# Patient Record
Sex: FEMALE | Race: WHITE | NOT HISPANIC OR LATINO | Employment: OTHER | URBAN - NONMETROPOLITAN AREA
[De-identification: names, ages, dates, MRNs, and addresses within clinical notes are randomized per-mention and may not be internally consistent; named-entity substitution may affect disease eponyms.]

---

## 2017-05-11 ENCOUNTER — OFFICE VISIT (OUTPATIENT)
Dept: CARDIOLOGY | Facility: CLINIC | Age: 57
End: 2017-05-11

## 2017-05-11 VITALS
DIASTOLIC BLOOD PRESSURE: 80 MMHG | SYSTOLIC BLOOD PRESSURE: 120 MMHG | WEIGHT: 209 LBS | OXYGEN SATURATION: 96 % | BODY MASS INDEX: 37.03 KG/M2 | HEART RATE: 78 BPM | HEIGHT: 63 IN

## 2017-05-11 DIAGNOSIS — I10 ESSENTIAL HYPERTENSION: ICD-10-CM

## 2017-05-11 DIAGNOSIS — E66.09 NON MORBID OBESITY DUE TO EXCESS CALORIES: ICD-10-CM

## 2017-05-11 DIAGNOSIS — Z72.0 TOBACCO ABUSE: ICD-10-CM

## 2017-05-11 DIAGNOSIS — E78.5 DYSLIPIDEMIA: ICD-10-CM

## 2017-05-11 DIAGNOSIS — I49.1 PREMATURE ATRIAL CONTRACTIONS: ICD-10-CM

## 2017-05-11 DIAGNOSIS — R55 SYNCOPE AND COLLAPSE: Primary | ICD-10-CM

## 2017-05-11 PROCEDURE — 99204 OFFICE O/P NEW MOD 45 MIN: CPT | Performed by: INTERNAL MEDICINE

## 2017-05-11 PROCEDURE — 93000 ELECTROCARDIOGRAM COMPLETE: CPT | Performed by: INTERNAL MEDICINE

## 2017-05-11 RX ORDER — METOPROLOL SUCCINATE 25 MG/1
25 TABLET, EXTENDED RELEASE ORAL DAILY
COMMUNITY
Start: 2017-05-02

## 2017-05-11 RX ORDER — ASPIRIN 81 MG/1
81 TABLET, CHEWABLE ORAL DAILY
COMMUNITY
End: 2019-02-20

## 2017-05-11 RX ORDER — ATORVASTATIN CALCIUM 40 MG/1
40 TABLET, FILM COATED ORAL DAILY
COMMUNITY
Start: 2017-05-01

## 2017-05-16 ENCOUNTER — OUTSIDE FACILITY SERVICE (OUTPATIENT)
Dept: CARDIOLOGY | Facility: CLINIC | Age: 57
End: 2017-05-16

## 2017-05-16 PROCEDURE — 93306 TTE W/DOPPLER COMPLETE: CPT | Performed by: INTERNAL MEDICINE

## 2017-07-12 ENCOUNTER — OFFICE VISIT (OUTPATIENT)
Dept: CARDIOLOGY | Facility: CLINIC | Age: 57
End: 2017-07-12

## 2017-07-12 VITALS
HEIGHT: 63 IN | WEIGHT: 215 LBS | HEART RATE: 74 BPM | BODY MASS INDEX: 38.09 KG/M2 | OXYGEN SATURATION: 95 % | DIASTOLIC BLOOD PRESSURE: 70 MMHG | SYSTOLIC BLOOD PRESSURE: 110 MMHG

## 2017-07-12 DIAGNOSIS — R55 SYNCOPE AND COLLAPSE: Primary | ICD-10-CM

## 2017-07-12 DIAGNOSIS — I10 ESSENTIAL HYPERTENSION: ICD-10-CM

## 2017-07-12 DIAGNOSIS — I47.1 PAROXYSMAL SVT (SUPRAVENTRICULAR TACHYCARDIA) (HCC): ICD-10-CM

## 2017-07-12 DIAGNOSIS — E78.5 DYSLIPIDEMIA: ICD-10-CM

## 2017-07-12 DIAGNOSIS — E66.09 NON MORBID OBESITY DUE TO EXCESS CALORIES: ICD-10-CM

## 2017-07-12 DIAGNOSIS — Z72.0 TOBACCO ABUSE: ICD-10-CM

## 2017-07-12 PROBLEM — I47.10 PAROXYSMAL SVT (SUPRAVENTRICULAR TACHYCARDIA): Status: ACTIVE | Noted: 2017-07-12

## 2017-07-12 PROCEDURE — 99214 OFFICE O/P EST MOD 30 MIN: CPT | Performed by: INTERNAL MEDICINE

## 2017-07-12 RX ORDER — LANOLIN ALCOHOL/MO/W.PET/CERES
1000 CREAM (GRAM) TOPICAL DAILY
COMMUNITY

## 2017-07-12 NOTE — PROGRESS NOTES
Reason for Visit: cardiovascular follow up.    HPI:  Shannon Coombs is a 57 y.o. female is here today for follow-up.  She has been feeling better.  She thinks the metoprolol has helped significantly.  Her lipid panel was repeated and she reports the numbers looked much better.  She has not been able to exercise much but is hoping to get the weight off.  She is trying to cut back on the cigarettes but has not done well over the past couple of weeks.      Previous Cardiac Testing and Procedures:  - Echo (6/25/14) EF 65%, grade 2 diastolic dysfunction, mild MR and TR, RVSP 48 mmHg  - Holter monitor (04/14/2017) 21,180 one atrial ectopic beats, runs of nonsustained SVT up to 5 beats at 167 bpm  - Echo (05/16/2017) EF 65%, normal diastolic function, normal valves    Patient Active Problem List   Diagnosis   • Dizziness   • Fatigue   • Syncope and collapse   • Paroxysmal SVT (supraventricular tachycardia)   • Non morbid obesity due to excess calories   • Dyslipidemia   • Essential hypertension   • Tobacco abuse       Social History   Substance Use Topics   • Smoking status: Current Every Day Smoker     Packs/day: 0.50     Years: 35.00     Types: Cigarettes   • Smokeless tobacco: Never Used   • Alcohol use Yes      Comment: occ       Family History   Problem Relation Age of Onset   • Dementia Mother    • Hyperlipidemia Mother    • Heart disease Father        The following portions of the patient's history were reviewed and updated as appropriate: allergies, current medications, past family history, past medical history, past social history, past surgical history and problem list.      Current Outpatient Prescriptions:   •  aspirin 81 MG chewable tablet, Chew 81 mg Daily., Disp: , Rfl:   •  atorvastatin (LIPITOR) 20 MG tablet, Take 20 mg by mouth Daily., Disp: , Rfl:   •  Calcium Carbonate-Vitamin D (CALTRATE 600+D PO), Take  by mouth 2 (Two) Times a Day., Disp: , Rfl:   •  metoprolol succinate XL  "(TOPROL-XL) 25 MG 24 hr tablet, Take 25 mg by mouth Daily., Disp: , Rfl:   •  vitamin B-12 (CYANOCOBALAMIN) 1000 MCG tablet, Take 1,000 mcg by mouth Daily., Disp: , Rfl:     Review of Systems   Constitution: Positive for malaise/fatigue. Negative for chills, decreased appetite and fever.   HENT: Positive for headaches. Negative for congestion and nosebleeds.    Eyes: Negative for blurred vision and double vision.   Cardiovascular: Positive for irregular heartbeat (when she gets stressed only now) and palpitations (when she gets stressed only now). Negative for chest pain, leg swelling and syncope.   Respiratory: Negative for cough and shortness of breath.    Endocrine: Positive for cold intolerance and heat intolerance.   Hematologic/Lymphatic: Bruises/bleeds easily.   Skin: Negative for dry skin, itching and rash.   Musculoskeletal: Positive for back pain, joint pain, muscle cramps and neck pain.   Gastrointestinal: Negative for abdominal pain, constipation, diarrhea, heartburn and melena.   Genitourinary: Positive for frequency. Negative for dysuria and hematuria.   Neurological: Negative for dizziness and light-headedness.   Psychiatric/Behavioral: Negative for depression. The patient has insomnia. The patient is not nervous/anxious.        Objective   /70 (BP Location: Left arm, Patient Position: Sitting, Cuff Size: Adult)  Pulse 74  Ht 63\" (160 cm)  Wt 215 lb (97.5 kg)  SpO2 95%  BMI 38.09 kg/m2  Physical Exam   Constitutional: She is oriented to person, place, and time. She appears well-developed and well-nourished.   HENT:   Head: Normocephalic and atraumatic.   Cardiovascular: Normal rate, regular rhythm and normal heart sounds.    No murmur heard.  Pulmonary/Chest: Effort normal and breath sounds normal.   Musculoskeletal: She exhibits no edema.   Neurological: She is alert and oriented to person, place, and time.   Skin: Skin is warm and dry.   Psychiatric: She has a normal mood and affect. "     Procedures      ICD-10-CM ICD-9-CM   1. Syncope and collapse R55 780.2   2. Paroxysmal SVT (supraventricular tachycardia) I47.1 427.0   3. Essential hypertension I10 401.9   4. Dyslipidemia E78.5 272.4   5. Tobacco abuse Z72.0 305.1   6. Non morbid obesity due to excess calories E66.09 278.00         Assessment/Plan:  1. Syncope: Most likely vasovagal.  No further episodes.  Structurally normal heart on echo.     2. Paroxysmal SVT: Short runs of nonsustained SVT noted on Holter monitor.  Likely contributing to syncope.  Symptoms improved with metoprolol.     3. Essential hypertension: Well controlled on metoprolol.      4. Dyslipidemia: Managed on atorvastatin.  Improved control per patient on repeat labs.      5. Tobacco abuse: Continues to smoke 1/2 pack per day. Continue to  on cessation.     6. Obesity: BMI 38, continue to  on diet, excise, weight loss.

## 2018-02-14 ENCOUNTER — OFFICE VISIT (OUTPATIENT)
Dept: CARDIOLOGY | Facility: CLINIC | Age: 58
End: 2018-02-14

## 2018-02-14 VITALS
SYSTOLIC BLOOD PRESSURE: 122 MMHG | BODY MASS INDEX: 38.09 KG/M2 | HEIGHT: 63 IN | DIASTOLIC BLOOD PRESSURE: 80 MMHG | HEART RATE: 81 BPM | WEIGHT: 215 LBS | OXYGEN SATURATION: 96 %

## 2018-02-14 DIAGNOSIS — E66.09 NON MORBID OBESITY DUE TO EXCESS CALORIES: ICD-10-CM

## 2018-02-14 DIAGNOSIS — I10 ESSENTIAL HYPERTENSION: ICD-10-CM

## 2018-02-14 DIAGNOSIS — I47.1 PAROXYSMAL SVT (SUPRAVENTRICULAR TACHYCARDIA) (HCC): Primary | ICD-10-CM

## 2018-02-14 DIAGNOSIS — E78.5 DYSLIPIDEMIA: ICD-10-CM

## 2018-02-14 DIAGNOSIS — Z72.0 TOBACCO ABUSE: ICD-10-CM

## 2018-02-14 PROCEDURE — 99214 OFFICE O/P EST MOD 30 MIN: CPT | Performed by: INTERNAL MEDICINE

## 2018-02-14 RX ORDER — NICOTINE 21 MG/24HR
1 PATCH, TRANSDERMAL 24 HOURS TRANSDERMAL EVERY 24 HOURS
Qty: 21 PATCH | Refills: 6 | Status: SHIPPED | OUTPATIENT
Start: 2018-02-14 | End: 2018-03-14

## 2018-02-14 NOTE — PROGRESS NOTES
Reason for Visit: cardiovascular follow up.    HPI:  Shannon Coombs is a 57 y.o. female is here today for follow-up.  She has been doing well from a cardiac standpoint.  She denies any chest pain, palpitations, dizziness, syncope, PND, or orthopnea.  She has some muscle cramps, particularly in her hands and was considering taking CoQ10.  She bought some over the counter but has not started taking it.  She has not had any further syncopal episode. Denies any episodes of heart racing.  Continues to smoke about 1/2 PPD.      Previous Cardiac Testing and Procedures:  - Echo (6/25/14) EF 65%, grade 2 diastolic dysfunction, mild MR and TR, RVSP 48 mmHg  - Holter monitor (04/14/2017) 21,180 one atrial ectopic beats, runs of nonsustained SVT up to 5 beats at 167 bpm  - Echo (05/16/2017) EF 65%, normal diastolic function, normal valves    Patient Active Problem List   Diagnosis   • Dizziness   • Fatigue   • Syncope and collapse   • Paroxysmal SVT (supraventricular tachycardia)   • Non morbid obesity due to excess calories   • Dyslipidemia   • Essential hypertension   • Tobacco abuse       Social History   Substance Use Topics   • Smoking status: Current Every Day Smoker     Packs/day: 0.50     Years: 35.00     Types: Cigarettes   • Smokeless tobacco: Never Used   • Alcohol use Yes      Comment: occ       Family History   Problem Relation Age of Onset   • Dementia Mother    • Hyperlipidemia Mother    • Heart disease Father    • Hyperlipidemia Father        The following portions of the patient's history were reviewed and updated as appropriate: allergies, current medications, past family history, past medical history, past social history, past surgical history and problem list.      Current Outpatient Prescriptions:   •  aspirin 81 MG chewable tablet, Chew 81 mg Daily., Disp: , Rfl:   •  atorvastatin (LIPITOR) 20 MG tablet, Take 20 mg by mouth Daily., Disp: , Rfl:   •  Calcium Carbonate-Vitamin D (CALTRATE  "600+D PO), Take  by mouth 2 (Two) Times a Day., Disp: , Rfl:   •  metoprolol succinate XL (TOPROL-XL) 25 MG 24 hr tablet, Take 25 mg by mouth Daily., Disp: , Rfl:   •  vitamin B-12 (CYANOCOBALAMIN) 1000 MCG tablet, Take 1,000 mcg by mouth Daily., Disp: , Rfl:   •  nicotine (NICODERM CQ) 14 MG/24HR patch, Place 1 patch on the skin Daily., Disp: 21 patch, Rfl: 6  •  nicotine polacrilex (NICORETTE) 4 MG gum, Chew 1 each As Needed for Smoking Cessation., Disp: 150 each, Rfl: 6    Review of Systems   Constitution: Negative for chills and fever.   Cardiovascular: Negative for chest pain and paroxysmal nocturnal dyspnea.   Respiratory: Negative for cough and shortness of breath.    Skin: Negative for rash.   Gastrointestinal: Negative for abdominal pain and heartburn.   Neurological: Negative for dizziness and numbness.       Objective   /80 (BP Location: Left arm, Patient Position: Sitting, Cuff Size: Adult)  Pulse 81  Ht 160 cm (62.99\")  Wt 97.5 kg (215 lb)  SpO2 96%  BMI 38.1 kg/m2  Physical Exam   Constitutional: She is oriented to person, place, and time. She appears well-developed and well-nourished.   HENT:   Head: Normocephalic and atraumatic.   Cardiovascular: Normal rate, regular rhythm and normal heart sounds.    No murmur heard.  Pulmonary/Chest: Effort normal and breath sounds normal.   Musculoskeletal: She exhibits no edema.   Neurological: She is alert and oriented to person, place, and time.   Skin: Skin is warm and dry.   Psychiatric: She has a normal mood and affect.     Procedures      ICD-10-CM ICD-9-CM   1. Paroxysmal SVT (supraventricular tachycardia) I47.1 427.0   2. Essential hypertension I10 401.9   3. Dyslipidemia E78.5 272.4   4. Tobacco abuse Z72.0 305.1   5. Non morbid obesity due to excess calories E66.09 278.00         Assessment/Plan:  1. Paroxysmal SVT: Symptoms controlled with metoprolol.  Patient denies any further palpitations or heart racing.      2. Essential hypertension: " Blood pressure remains well-controlled on current therapy.       3. Dyslipidemia: Continue atorvastatin.  I agree with plans to try CoQ10.       5. Tobacco abuse: Continues to smoke 1/2 pack per day.  But interested in quitting.  Will try nicotine replacement therapy.  Time spent counseling patient, greater than 10 minutes.      6. Obesity: BMI 38, continue to  on lifestyle modification and weight loss.

## 2018-03-14 ENCOUNTER — OFFICE VISIT (OUTPATIENT)
Dept: OTOLARYNGOLOGY | Facility: CLINIC | Age: 58
End: 2018-03-14

## 2018-03-14 VITALS
HEART RATE: 69 BPM | DIASTOLIC BLOOD PRESSURE: 87 MMHG | WEIGHT: 215 LBS | HEIGHT: 63 IN | BODY MASS INDEX: 38.09 KG/M2 | TEMPERATURE: 97.6 F | SYSTOLIC BLOOD PRESSURE: 130 MMHG

## 2018-03-14 DIAGNOSIS — H91.90 HEARING LOSS, UNSPECIFIED HEARING LOSS TYPE, UNSPECIFIED LATERALITY: Primary | ICD-10-CM

## 2018-03-14 DIAGNOSIS — J34.2 DEVIATED NASAL SEPTUM: ICD-10-CM

## 2018-03-14 DIAGNOSIS — J30.9 ALLERGIC RHINITIS, UNSPECIFIED CHRONICITY, UNSPECIFIED SEASONALITY, UNSPECIFIED TRIGGER: ICD-10-CM

## 2018-03-14 DIAGNOSIS — J01.90 ACUTE SINUSITIS, RECURRENCE NOT SPECIFIED, UNSPECIFIED LOCATION: ICD-10-CM

## 2018-03-14 PROCEDURE — 4004F PT TOBACCO SCREEN RCVD TLK: CPT | Performed by: NURSE PRACTITIONER

## 2018-03-14 PROCEDURE — 99203 OFFICE O/P NEW LOW 30 MIN: CPT | Performed by: NURSE PRACTITIONER

## 2018-03-14 RX ORDER — AMOXICILLIN AND CLAVULANATE POTASSIUM 875; 125 MG/1; MG/1
1 TABLET, FILM COATED ORAL EVERY 12 HOURS SCHEDULED
Qty: 20 TABLET | Refills: 0 | Status: SHIPPED | OUTPATIENT
Start: 2018-03-14 | End: 2018-03-24

## 2018-03-14 RX ORDER — CYCLOBENZAPRINE HCL 10 MG
10 TABLET ORAL DAILY PRN
COMMUNITY
Start: 2018-01-09

## 2018-03-14 NOTE — PATIENT INSTRUCTIONS
Steps to Quit Smoking  Smoking tobacco can be harmful to your health and can affect almost every organ in your body. Smoking puts you, and those around you, at risk for developing many serious chronic diseases. Quitting smoking is difficult, but it is one of the best things that you can do for your health. It is never too late to quit.  What are the benefits of quitting smoking?  When you quit smoking, you lower your risk of developing serious diseases and conditions, such as:  · Lung cancer or lung disease, such as COPD.  · Heart disease.  · Stroke.  · Heart attack.  · Infertility.  · Osteoporosis and bone fractures.  Additionally, symptoms such as coughing, wheezing, and shortness of breath may get better when you quit. You may also find that you get sick less often because your body is stronger at fighting off colds and infections. If you are pregnant, quitting smoking can help to reduce your chances of having a baby of low birth weight.  How do I get ready to quit?  When you decide to quit smoking, create a plan to make sure that you are successful. Before you quit:  · Pick a date to quit. Set a date within the next two weeks to give you time to prepare.  · Write down the reasons why you are quitting. Keep this list in places where you will see it often, such as on your bathroom mirror or in your car or wallet.  · Identify the people, places, things, and activities that make you want to smoke (triggers) and avoid them. Make sure to take these actions:  ¨ Throw away all cigarettes at home, at work, and in your car.  ¨ Throw away smoking accessories, such as ashtrays and lighters.  ¨ Clean your car and make sure to empty the ashtray.  ¨ Clean your home, including curtains and carpets.  · Tell your family, friends, and coworkers that you are quitting. Support from your loved ones can make quitting easier.  · Talk with your health care provider about your options for quitting smoking.  · Find out what treatment  options are covered by your health insurance.  What strategies can I use to quit smoking?  Talk with your healthcare provider about different strategies to quit smoking. Some strategies include:  · Quitting smoking altogether instead of gradually lessening how much you smoke over a period of time. Research shows that quitting “cold turkey” is more successful than gradually quitting.  · Attending in-person counseling to help you build problem-solving skills. You are more likely to have success in quitting if you attend several counseling sessions. Even short sessions of 10 minutes can be effective.  · Finding resources and support systems that can help you to quit smoking and remain smoke-free after you quit. These resources are most helpful when you use them often. They can include:  ¨ Online chats with a counselor.  ¨ Telephone quitlines.  ¨ Printed self-help materials.  ¨ Support groups or group counseling.  ¨ Text messaging programs.  ¨ Mobile phone applications.  · Taking medicines to help you quit smoking. (If you are pregnant or breastfeeding, talk with your health care provider first.) Some medicines contain nicotine and some do not. Both types of medicines help with cravings, but the medicines that include nicotine help to relieve withdrawal symptoms. Your health care provider may recommend:  ¨ Nicotine patches, gum, or lozenges.  ¨ Nicotine inhalers or sprays.  ¨ Non-nicotine medicine that is taken by mouth.  Talk with your health care provider about combining strategies, such as taking medicines while you are also receiving in-person counseling. Using these two strategies together makes you more likely to succeed in quitting than if you used either strategy on its own.  If you are pregnant or breastfeeding, talk with your health care provider about finding counseling or other support strategies to quit smoking. Do not take medicine to help you quit smoking unless told to do so by your health care  provider.  What things can I do to make it easier to quit?  Quitting smoking might feel overwhelming at first, but there is a lot that you can do to make it easier. Take these important actions:  · Reach out to your family and friends and ask that they support and encourage you during this time. Call telephone quitlines, reach out to support groups, or work with a counselor for support.  · Ask people who smoke to avoid smoking around you.  · Avoid places that trigger you to smoke, such as bars, parties, or smoke-break areas at work.  · Spend time around people who do not smoke.  · Lessen stress in your life, because stress can be a smoking trigger for some people. To lessen stress, try:  ¨ Exercising regularly.  ¨ Deep-breathing exercises.  ¨ Yoga.  ¨ Meditating.  ¨ Performing a body scan. This involves closing your eyes, scanning your body from head to toe, and noticing which parts of your body are particularly tense. Purposefully relax the muscles in those areas.  · Download or purchase mobile phone or tablet apps (applications) that can help you stick to your quit plan by providing reminders, tips, and encouragement. There are many free apps, such as QuitGuide from the CDC (Centers for Disease Control and Prevention). You can find other support for quitting smoking (smoking cessation) through smokefree.gov and other websites.  How will I feel when I quit smoking?  Within the first 24 hours of quitting smoking, you may start to feel some withdrawal symptoms. These symptoms are usually most noticeable 2-3 days after quitting, but they usually do not last beyond 2-3 weeks. Changes or symptoms that you might experience include:  · Mood swings.  · Restlessness, anxiety, or irritation.  · Difficulty concentrating.  · Dizziness.  · Strong cravings for sugary foods in addition to nicotine.  · Mild weight gain.  · Constipation.  · Nausea.  · Coughing or a sore throat.  · Changes in how your medicines work in your  body.  · A depressed mood.  · Difficulty sleeping (insomnia).  After the first 2-3 weeks of quitting, you may start to notice more positive results, such as:  · Improved sense of smell and taste.  · Decreased coughing and sore throat.  · Slower heart rate.  · Lower blood pressure.  · Clearer skin.  · The ability to breathe more easily.  · Fewer sick days.  Quitting smoking is very challenging for most people. Do not get discouraged if you are not successful the first time. Some people need to make many attempts to quit before they achieve long-term success. Do your best to stick to your quit plan, and talk with your health care provider if you have any questions or concerns.  This information is not intended to replace advice given to you by your health care provider. Make sure you discuss any questions you have with your health care provider.  Document Released: 12/12/2002 Document Revised: 08/15/2017 Document Reviewed: 05/03/2016  [employer] Interactive Patient Education © 2017 [employer]    Medical vs surgical options discussed    Call for problems or worsening symptoms    If worsens or failure to improve, consider imaging studies  Start Flonase

## 2018-03-14 NOTE — PROGRESS NOTES
YOB: 1960  Location: [facility]  Location Address: [address] [place] [address], [facility], [address] [place] [address]  Location Phone: [phone]    Chief Complaint   Patient presents with   • Sinus Problem       History of Present Illness  Shannon Coombs is a 57 y.o. female.  Shannon Coombs is here for evaluation of ENT complaints. The patient has had problems with ear fullness, ear pressure and popping and cracking of the ear, nasal drainage, nasal congestion, sinusitis and sinus pressure  The symptoms are not localized to a particular location. The patient has had moderate symptoms. The symptoms have been present for the last several months The symptoms are aggravated by  no identifiable factors. The symptoms are improved by no identifiable factors.  Positive for smoking.  Denies acute hearing changes.  Positive for sinus pressure, has not been treated with abx.  She has a rx for steroids and flonase she hasnt started wanted to see us first.  Denies headaches, fever, epistaxis.         Past Medical History:   Diagnosis Date   • Atrial premature depolarization    • Cardiac arrhythmia    • Dental abscess    • Dizziness and giddiness    • Hypotension    • Knee pain, right    • Rotator cuff tear    • Syncope and collapse    • Syncope and collapse    • Visual disturbance        Past Surgical History:   Procedure Laterality Date   • GANGLION CYST EXCISION      foot       Outpatient Prescriptions Marked as Taking for the 3/14/18 encounter (Office Visit) with ANTIONE Zarco   Medication Sig Dispense Refill   • aspirin 81 MG chewable tablet Chew 81 mg Daily.     • atorvastatin (LIPITOR) 20 MG tablet Take 20 mg by mouth Daily.     • Calcium Carbonate-Vitamin D (CALTRATE 600+D PO) Take  by mouth 2 (Two) Times a Day.     • cyclobenzaprine (FLEXERIL) 10 MG tablet      • metoprolol succinate XL (TOPROL-XL) 25 MG 24 hr tablet Take 25 mg by mouth Daily.     • vitamin B-12 (CYANOCOBALAMIN) 1000  MCG tablet Take 1,000 mcg by mouth Daily.         Aleve [naproxen sodium]    Family History   Problem Relation Age of Onset   • Dementia Mother    • Hyperlipidemia Mother    • Heart disease Father    • Hyperlipidemia Father        Social History     Social History   • Marital status:      Spouse name: N/A   • Number of children: N/A   • Years of education: N/A     Occupational History   • Not on file.     Social History Main Topics   • Smoking status: Current Every Day Smoker     Packs/day: 0.50     Years: 35.00     Types: Cigarettes   • Smokeless tobacco: Never Used   • Alcohol use Yes      Comment: rarely   • Drug use: No   • Sexual activity: Not on file     Other Topics Concern   • Not on file     Social History Narrative   • No narrative on file       Review of Systems   Constitutional: Negative.    HENT:        SEE HPI   Eyes: Negative.    Respiratory: Negative.    Cardiovascular: Negative.    Gastrointestinal: Negative.    Endocrine: Negative.    Genitourinary: Negative.    Musculoskeletal: Negative.    Skin: Negative.    Allergic/Immunologic: Negative.    Neurological: Negative.    Hematological: Negative.    Psychiatric/Behavioral: Negative.        Vitals:    03/14/18 1127   BP: 130/87   Pulse: 69   Temp: 97.6 °F (36.4 °C)       Body mass index is 38.09 kg/m².    Objective     Physical Exam  CONSTITUTIONAL: well nourished, alert, oriented, in no acute distress     COMMUNICATION AND VOICE: able to communicate normally, normal voice quality    HEAD: normocephalic, no lesions, atraumatic, no tenderness, no masses     FACE: appearance normal, no lesions, no tenderness, no deformities, facial motion symmetric    SALIVARY GLANDS: parotid glands with no tenderness, no swelling, no masses, submandibular glands with normal size, nontender    EYES: ocular motility normal, eyelids normal, orbits normal, no proptosis, conjunctiva normal , pupils equal, round     EARS:  Hearing: response to conversational voice  normal bilaterally   External Ears: auricles without lesions  Otoscopic: tympanic membrane appearance normal, no lesions, no perforation, normal mobility, no fluid    NOSE:  External Nose: structure normal, no tenderness on palpation, no nasal discharge, no lesions, no evidence of trauma, nostrils patent   Intranasal Exam: nasal mucosa edema, vestibule within normal limits, inferior turbinate normal, right septal deviation with spur     ORAL:  Lips: upper and lower lips without lesion   Teeth: dentition within normal limits for age   Gums: gingivae healthy   Oral Mucosa: oral mucosa normal, no mucosal lesions   Floor of Mouth: Warthin’s duct patent, mucosa normal  Tongue: lingual mucosa normal without lesions, normal tongue mobility   Palate: soft and hard palates with normal mucosa and structure  Oropharynx: oropharyngeal mucosa normal    NECK: neck appearance normal, no mass,  noted without erythema or tenderness    THYROID: no overt thyromegaly, no tenderness, nodules or mass present on palpation, position midline     LYMPH NODES: no lymphadenopathy    CHEST/RESPIRATORY: respiratory effort normal,     CARDIOVASCULAR:  extremities without cyanosis or edema      NEUROLOGIC/PSYCHIATRIC: oriented to time, place and person, mood normal, affect appropriate, CN II-XII intact grossly    Assessment/Plan   Shannon was seen today for sinus problem.    Diagnoses and all orders for this visit:    Hearing loss, unspecified hearing loss type, unspecified laterality  -     Comprehensive Hearing Test; Future    Allergic rhinitis, unspecified chronicity, unspecified seasonality, unspecified trigger    Deviated nasal septum    Acute sinusitis, recurrence not specified, unspecified location    Other orders  -     amoxicillin-clavulanate (AUGMENTIN) 875-125 MG per tablet; Take 1 tablet by mouth Every 12 (Twelve) Hours for 10 days.  -     mupirocin (BACTROBAN) 2 % nasal ointment; into each nostril 2 (Two) Times a Day for 14 days.  Apply to the nose twice daily      * Surgery not found *  Orders Placed This Encounter   Procedures   • Comprehensive Hearing Test     Standing Status:   Future     Standing Expiration Date:   3/14/2019     Order Specific Question:   Laterality     Answer:   Bilateral     Return in about 8 weeks (around 5/9/2018).       Patient Instructions   Steps to Quit Smoking  Smoking tobacco can be harmful to your health and can affect almost every organ in your body. Smoking puts you, and those around you, at risk for developing many serious chronic diseases. Quitting smoking is difficult, but it is one of the best things that you can do for your health. It is never too late to quit.  What are the benefits of quitting smoking?  When you quit smoking, you lower your risk of developing serious diseases and conditions, such as:  · Lung cancer or lung disease, such as COPD.  · Heart disease.  · Stroke.  · Heart attack.  · Infertility.  · Osteoporosis and bone fractures.  Additionally, symptoms such as coughing, wheezing, and shortness of breath may get better when you quit. You may also find that you get sick less often because your body is stronger at fighting off colds and infections. If you are pregnant, quitting smoking can help to reduce your chances of having a baby of low birth weight.  How do I get ready to quit?  When you decide to quit smoking, create a plan to make sure that you are successful. Before you quit:  · Pick a date to quit. Set a date within the next two weeks to give you time to prepare.  · Write down the reasons why you are quitting. Keep this list in places where you will see it often, such as on your bathroom mirror or in your car or wallet.  · Identify the people, places, things, and activities that make you want to smoke (triggers) and avoid them. Make sure to take these actions:  ¨ Throw away all cigarettes at home, at work, and in your car.  ¨ Throw away smoking accessories, such as ashtrays and  lighters.  ¨ Clean your car and make sure to empty the ashtray.  ¨ Clean your home, including curtains and carpets.  · Tell your family, friends, and coworkers that you are quitting. Support from your loved ones can make quitting easier.  · Talk with your health care provider about your options for quitting smoking.  · Find out what treatment options are covered by your health insurance.  What strategies can I use to quit smoking?  Talk with your healthcare provider about different strategies to quit smoking. Some strategies include:  · Quitting smoking altogether instead of gradually lessening how much you smoke over a period of time. Research shows that quitting “cold turkey” is more successful than gradually quitting.  · Attending in-person counseling to help you build problem-solving skills. You are more likely to have success in quitting if you attend several counseling sessions. Even short sessions of 10 minutes can be effective.  · Finding resources and support systems that can help you to quit smoking and remain smoke-free after you quit. These resources are most helpful when you use them often. They can include:  ¨ Online chats with a counselor.  ¨ Telephone quitlines.  ¨ Printed self-help materials.  ¨ Support groups or group counseling.  ¨ Text messaging programs.  ¨ Mobile phone applications.  · Taking medicines to help you quit smoking. (If you are pregnant or breastfeeding, talk with your health care provider first.) Some medicines contain nicotine and some do not. Both types of medicines help with cravings, but the medicines that include nicotine help to relieve withdrawal symptoms. Your health care provider may recommend:  ¨ Nicotine patches, gum, or lozenges.  ¨ Nicotine inhalers or sprays.  ¨ Non-nicotine medicine that is taken by mouth.  Talk with your health care provider about combining strategies, such as taking medicines while you are also receiving in-person counseling. Using these two  strategies together makes you more likely to succeed in quitting than if you used either strategy on its own.  If you are pregnant or breastfeeding, talk with your health care provider about finding counseling or other support strategies to quit smoking. Do not take medicine to help you quit smoking unless told to do so by your health care provider.  What things can I do to make it easier to quit?  Quitting smoking might feel overwhelming at first, but there is a lot that you can do to make it easier. Take these important actions:  · Reach out to your family and friends and ask that they support and encourage you during this time. Call telephone quitlines, reach out to support groups, or work with a counselor for support.  · Ask people who smoke to avoid smoking around you.  · Avoid places that trigger you to smoke, such as bars, parties, or smoke-break areas at work.  · Spend time around people who do not smoke.  · Lessen stress in your life, because stress can be a smoking trigger for some people. To lessen stress, try:  ¨ Exercising regularly.  ¨ Deep-breathing exercises.  ¨ Yoga.  ¨ Meditating.  ¨ Performing a body scan. This involves closing your eyes, scanning your body from head to toe, and noticing which parts of your body are particularly tense. Purposefully relax the muscles in those areas.  · Download or purchase mobile phone or tablet apps (applications) that can help you stick to your quit plan by providing reminders, tips, and encouragement. There are many free apps, such as QuitGuide from the CDC (Centers for Disease Control and Prevention). You can find other support for quitting smoking (smoking cessation) through smokefree.gov and other websites.  How will I feel when I quit smoking?  Within the first 24 hours of quitting smoking, you may start to feel some withdrawal symptoms. These symptoms are usually most noticeable 2-3 days after quitting, but they usually do not last beyond 2-3 weeks. Changes  or symptoms that you might experience include:  · Mood swings.  · Restlessness, anxiety, or irritation.  · Difficulty concentrating.  · Dizziness.  · Strong cravings for sugary foods in addition to nicotine.  · Mild weight gain.  · Constipation.  · Nausea.  · Coughing or a sore throat.  · Changes in how your medicines work in your body.  · A depressed mood.  · Difficulty sleeping (insomnia).  After the first 2-3 weeks of quitting, you may start to notice more positive results, such as:  · Improved sense of smell and taste.  · Decreased coughing and sore throat.  · Slower heart rate.  · Lower blood pressure.  · Clearer skin.  · The ability to breathe more easily.  · Fewer sick days.  Quitting smoking is very challenging for most people. Do not get discouraged if you are not successful the first time. Some people need to make many attempts to quit before they achieve long-term success. Do your best to stick to your quit plan, and talk with your health care provider if you have any questions or concerns.  This information is not intended to replace advice given to you by your health care provider. Make sure you discuss any questions you have with your health care provider.  Document Released: 12/12/2002 Document Revised: 08/15/2017 Document Reviewed: 05/03/2016  [employer] Interactive Patient Education © 2017 [employer]    Medical vs surgical options discussed    Call for problems or worsening symptoms    If worsens or failure to improve, consider imaging studies  Start Flonase

## 2018-08-29 ENCOUNTER — OFFICE VISIT (OUTPATIENT)
Dept: CARDIOLOGY | Facility: CLINIC | Age: 58
End: 2018-08-29

## 2018-08-29 VITALS
BODY MASS INDEX: 31.36 KG/M2 | SYSTOLIC BLOOD PRESSURE: 108 MMHG | DIASTOLIC BLOOD PRESSURE: 70 MMHG | HEIGHT: 63 IN | OXYGEN SATURATION: 97 % | HEART RATE: 77 BPM | WEIGHT: 177 LBS

## 2018-08-29 DIAGNOSIS — E78.5 DYSLIPIDEMIA: ICD-10-CM

## 2018-08-29 DIAGNOSIS — Z72.0 TOBACCO ABUSE: ICD-10-CM

## 2018-08-29 DIAGNOSIS — E66.09 NON MORBID OBESITY DUE TO EXCESS CALORIES: ICD-10-CM

## 2018-08-29 DIAGNOSIS — I47.1 PAROXYSMAL SVT (SUPRAVENTRICULAR TACHYCARDIA) (HCC): Primary | ICD-10-CM

## 2018-08-29 DIAGNOSIS — I10 ESSENTIAL HYPERTENSION: ICD-10-CM

## 2018-08-29 PROCEDURE — 99406 BEHAV CHNG SMOKING 3-10 MIN: CPT | Performed by: INTERNAL MEDICINE

## 2018-08-29 PROCEDURE — 99214 OFFICE O/P EST MOD 30 MIN: CPT | Performed by: INTERNAL MEDICINE

## 2018-08-29 RX ORDER — RANITIDINE 300 MG/1
300 CAPSULE ORAL EVERY EVENING
COMMUNITY
End: 2020-08-06 | Stop reason: ALTCHOICE

## 2018-08-29 NOTE — PROGRESS NOTES
Reason for Visit: cardiovascular follow up.    HPI:  Shannon Coombs is a 58 y.o. female is here today for follow-up.  She has lost 38 lbs since last visit.  Has significant changed her diet.  Gave up dairy and mostly eat lean meats, fruits, and vegetables.  Continues to smoke about 1/2 PPD and was not able to quit with nicotine patches as her insurance did not pay for it.  She wants to quit on her own.  Has no cardiac complaints and denies any chest pain, palpitations, dizziness, syncope, PND, or orthopnea.      Previous Cardiac Testing and Procedures:  - Echo (6/25/14) EF 65%, grade 2 diastolic dysfunction, mild MR and TR, RVSP 48 mmHg  - Holter monitor (04/14/2017) 21,180 one atrial ectopic beats, runs of nonsustained SVT up to 5 beats at 167 bpm  - Echo (05/16/2017) EF 65%, normal diastolic function, normal valves  - Lipid panel (07/28/2018) total cholesterol 141, HDL 45, LDL 79, triglycerides 86    Patient Active Problem List   Diagnosis   • Dizziness   • Fatigue   • Syncope and collapse   • Paroxysmal SVT (supraventricular tachycardia) (CMS/HCC)   • Non morbid obesity due to excess calories   • Dyslipidemia   • Essential hypertension   • Tobacco abuse       Social History   Substance Use Topics   • Smoking status: Current Every Day Smoker     Packs/day: 0.50     Years: 35.00     Types: Cigarettes   • Smokeless tobacco: Never Used   • Alcohol use Yes      Comment: rarely       Family History   Problem Relation Age of Onset   • Dementia Mother    • Hyperlipidemia Mother    • Heart disease Father    • Hyperlipidemia Father        The following portions of the patient's history were reviewed and updated as appropriate: allergies, current medications, past family history, past medical history, past social history, past surgical history and problem list.      Current Outpatient Prescriptions:   •  aspirin 81 MG chewable tablet, Chew 81 mg Daily., Disp: , Rfl:   •  atorvastatin (LIPITOR) 20 MG tablet,  "Take 20 mg by mouth Daily., Disp: , Rfl:   •  Calcium Carbonate-Vitamin D (CALTRATE 600+D PO), Take  by mouth 2 (Two) Times a Day., Disp: , Rfl:   •  cyclobenzaprine (FLEXERIL) 10 MG tablet, , Disp: , Rfl:   •  metoprolol succinate XL (TOPROL-XL) 25 MG 24 hr tablet, Take 25 mg by mouth Daily., Disp: , Rfl:   •  Probiotic Product (PROBIOTIC-10 PO), Take  by mouth., Disp: , Rfl:   •  ranitidine (ZANTAC) 300 MG capsule, Take 300 mg by mouth Every Evening., Disp: , Rfl:   •  vitamin B-12 (CYANOCOBALAMIN) 1000 MCG tablet, Take 1,000 mcg by mouth Daily., Disp: , Rfl:     Review of Systems   Constitution: Negative for chills and fever.   Cardiovascular: Negative for chest pain and paroxysmal nocturnal dyspnea.   Respiratory: Negative for cough and shortness of breath.    Skin: Negative for rash.   Gastrointestinal: Negative for abdominal pain and heartburn.   Neurological: Negative for dizziness and numbness.       Objective   /70 (BP Location: Left arm, Patient Position: Sitting, Cuff Size: Adult)   Pulse 77   Ht 160 cm (62.99\")   Wt 80.3 kg (177 lb)   SpO2 97%   BMI 31.36 kg/m²   Physical Exam   Constitutional: She is oriented to person, place, and time. She appears well-developed and well-nourished.   HENT:   Head: Normocephalic and atraumatic.   Cardiovascular: Normal rate, regular rhythm and normal heart sounds.    No murmur heard.  Pulmonary/Chest: Effort normal and breath sounds normal.   Musculoskeletal: She exhibits no edema.   Neurological: She is alert and oriented to person, place, and time.   Skin: Skin is warm and dry.   Psychiatric: She has a normal mood and affect.     Procedures      ICD-10-CM ICD-9-CM   1. Paroxysmal SVT (supraventricular tachycardia) (CMS/[facility]) I47.1 427.0   2. Essential hypertension I10 401.9   3. Dyslipidemia E78.5 272.4   4. Tobacco abuse Z72.0 305.1   5. Non morbid obesity due to excess calories E66.09 278.00         Assessment/Plan:  1. Paroxysmal SVT: Continue " metoprolol.If palpitations or symptoms.      2. Essential hypertension: Blood pressure remains well-controlled on current therapy.       3. Dyslipidemia: Continue atorvastatin.  Well-controlled on last lipid panel.      4. Tobacco abuse: I advised Shannon of the risks of continuing to use tobacco, and I provided her with tobacco cessation educational materials in the After Visit Summary. During this visit, I spent 5 minutes counseling the patient regarding tobacco cessation.  She plans to quit on her own.      5. Obesity: Body mass index is 31.36 kg/m².  Has lost 30 pounds since last visit.  Continue  on lifestyle modification and weight loss.

## 2019-02-20 ENCOUNTER — OFFICE VISIT (OUTPATIENT)
Dept: CARDIOLOGY | Facility: CLINIC | Age: 59
End: 2019-02-20

## 2019-02-20 VITALS
DIASTOLIC BLOOD PRESSURE: 78 MMHG | WEIGHT: 169 LBS | BODY MASS INDEX: 29.95 KG/M2 | OXYGEN SATURATION: 96 % | HEIGHT: 63 IN | SYSTOLIC BLOOD PRESSURE: 120 MMHG | HEART RATE: 61 BPM

## 2019-02-20 DIAGNOSIS — F17.201 TOBACCO ABUSE, IN REMISSION: ICD-10-CM

## 2019-02-20 DIAGNOSIS — E78.5 DYSLIPIDEMIA: ICD-10-CM

## 2019-02-20 DIAGNOSIS — I47.1 PAROXYSMAL SVT (SUPRAVENTRICULAR TACHYCARDIA) (HCC): Primary | ICD-10-CM

## 2019-02-20 DIAGNOSIS — I10 ESSENTIAL HYPERTENSION: ICD-10-CM

## 2019-02-20 PROBLEM — E66.09 NON MORBID OBESITY DUE TO EXCESS CALORIES: Status: RESOLVED | Noted: 2017-07-12 | Resolved: 2019-02-20

## 2019-02-20 PROCEDURE — 99214 OFFICE O/P EST MOD 30 MIN: CPT | Performed by: INTERNAL MEDICINE

## 2019-02-20 NOTE — PROGRESS NOTES
Reason for Visit: cardiovascular follow up.    HPI:  Shannon Coombs is a 58 y.o. female is here today for follow-up.  He has lost 8 pounds since last visit.  She has been eating healthy and focusing on losing weight.  She has stopped smoking on valentines day.  Uses nicotine gum as needed.  She has been doing well from a cardiac standpoint.  She denies any chest pain, palpitations, dizziness, syncope, PND, or orthopnea.  She repeated her lipid panel earlier this month and it demonstrates reasonably good control.  She notes occasional muscle cramps and is wondering about the possibility of decreasing her statin dose and whether or not a coronary calcium score would offer her any benefit.    Previous Cardiac Testing and Procedures:  - Echo (14) EF 65%, grade 2 diastolic dysfunction, mild MR and TR, RVSP 48 mmHg  - Holter monitor (2017) 21,180 one atrial ectopic beats, runs of nonsustained SVT up to 5 beats at 167 bpm  - Echo (2017) EF 65%, normal diastolic function, normal valves  - Lipid panel (2018) total cholesterol 141, HDL 45, LDL 79, triglycerides 86  - Lipid panel (2/15/2019) total cholesterol 165, HDL 60, LDL 85, triglycerides 102    Patient Active Problem List   Diagnosis   • Dizziness   • Fatigue   • Syncope and collapse   • Paroxysmal SVT (supraventricular tachycardia) (CMS/HCC)   • Dyslipidemia   • Essential hypertension   • Tobacco abuse       Social History     Tobacco Use   • Smoking status: Former Smoker     Packs/day: 0.50     Years: 35.00     Pack years: 17.50     Types: Cigarettes     Last attempt to quit: 2019     Years since quittin.0   • Smokeless tobacco: Never Used   Substance Use Topics   • Alcohol use: Yes     Comment: rarely   • Drug use: No       Family History   Problem Relation Age of Onset   • Dementia Mother    • Hyperlipidemia Mother    • Heart disease Father    • Hyperlipidemia Father        The following portions of the patient's history  "were reviewed and updated as appropriate: allergies, current medications, past family history, past medical history, past social history, past surgical history and problem list.      Current Outpatient Medications:   •  atorvastatin (LIPITOR) 20 MG tablet, Take 20 mg by mouth Daily., Disp: , Rfl:   •  Calcium Carbonate-Vitamin D (CALTRATE 600+D PO), Take  by mouth 2 (Two) Times a Day., Disp: , Rfl:   •  cyclobenzaprine (FLEXERIL) 10 MG tablet, , Disp: , Rfl:   •  metoprolol succinate XL (TOPROL-XL) 25 MG 24 hr tablet, Take 25 mg by mouth Daily., Disp: , Rfl:   •  nicotine polacrilex (NICORETTE) 4 MG gum, Chew 4 mg As Needed for Smoking Cessation., Disp: , Rfl:   •  Probiotic Product (PROBIOTIC-10 PO), Take  by mouth., Disp: , Rfl:   •  ranitidine (ZANTAC) 300 MG capsule, Take 300 mg by mouth Every Evening., Disp: , Rfl:   •  vitamin B-12 (CYANOCOBALAMIN) 1000 MCG tablet, Take 1,000 mcg by mouth Daily., Disp: , Rfl:     Review of Systems   Constitution: Negative for chills and fever.   Cardiovascular: Negative for chest pain and paroxysmal nocturnal dyspnea.   Respiratory: Negative for cough and shortness of breath.    Skin: Negative for rash.   Musculoskeletal: Positive for muscle cramps.   Gastrointestinal: Negative for abdominal pain and heartburn.   Neurological: Negative for dizziness and numbness.       Objective   /78 (BP Location: Left arm, Patient Position: Sitting, Cuff Size: Adult)   Pulse 61   Ht 160 cm (62.99\")   Wt 76.7 kg (169 lb)   SpO2 96%   BMI 29.94 kg/m²   Physical Exam   Constitutional: She is oriented to person, place, and time. She appears well-developed and well-nourished.   HENT:   Head: Normocephalic and atraumatic.   Cardiovascular: Normal rate, regular rhythm and normal heart sounds.   No murmur heard.  Pulmonary/Chest: Effort normal and breath sounds normal.   Abdominal: She exhibits no distension.   Musculoskeletal: She exhibits no edema.   Neurological: She is alert and " oriented to person, place, and time.   Skin: Skin is warm and dry.   Psychiatric: She has a normal mood and affect.     Procedures      ICD-10-CM ICD-9-CM   1. Paroxysmal SVT (supraventricular tachycardia) (CMS/[facility]) I47.1 427.0   2. Essential hypertension I10 401.9   3. Dyslipidemia E78.5 272.4   4. Tobacco abuse, in remission F17.201 V15.82         Assessment/Plan:  1. Paroxysmal SVT:  Currently asymptomatic.  Continue metoprolol.      2. Essential hypertension: Blood pressure remains well-controlled on current medications.       3. Dyslipidemia: Reasonably well controlled on atorvastatin.  If muscle cramps worsen can consider decreasing atorvastatin dose.      4. Tobacco abuse in remission: Counseled patient on continued abstinence from tobacco products.

## 2019-10-30 ENCOUNTER — OFFICE VISIT (OUTPATIENT)
Dept: CARDIOLOGY | Facility: CLINIC | Age: 59
End: 2019-10-30

## 2019-10-30 VITALS
WEIGHT: 202 LBS | HEIGHT: 63 IN | HEART RATE: 74 BPM | SYSTOLIC BLOOD PRESSURE: 138 MMHG | DIASTOLIC BLOOD PRESSURE: 98 MMHG | OXYGEN SATURATION: 100 % | BODY MASS INDEX: 35.79 KG/M2

## 2019-10-30 DIAGNOSIS — E78.5 DYSLIPIDEMIA: ICD-10-CM

## 2019-10-30 DIAGNOSIS — I47.1 PAROXYSMAL SVT (SUPRAVENTRICULAR TACHYCARDIA) (HCC): Primary | ICD-10-CM

## 2019-10-30 DIAGNOSIS — E66.09 CLASS 2 OBESITY DUE TO EXCESS CALORIES WITHOUT SERIOUS COMORBIDITY WITH BODY MASS INDEX (BMI) OF 35.0 TO 35.9 IN ADULT: ICD-10-CM

## 2019-10-30 DIAGNOSIS — I10 ESSENTIAL HYPERTENSION: ICD-10-CM

## 2019-10-30 PROBLEM — Z72.0 TOBACCO ABUSE: Status: RESOLVED | Noted: 2017-07-12 | Resolved: 2019-10-30

## 2019-10-30 PROCEDURE — 93000 ELECTROCARDIOGRAM COMPLETE: CPT | Performed by: INTERNAL MEDICINE

## 2019-10-30 PROCEDURE — 99214 OFFICE O/P EST MOD 30 MIN: CPT | Performed by: INTERNAL MEDICINE

## 2019-10-30 RX ORDER — SPIRONOLACTONE 25 MG/1
25 TABLET ORAL DAILY
COMMUNITY
Start: 2022-11-19

## 2019-10-30 NOTE — PROGRESS NOTES
Reason for Visit: cardiovascular follow up.    HPI:  Shannon Coombs is a 59 y.o. female is here today for follow-up.  She remains smoke free but has gained 33 pounds since last visit.  Her appetite has increased and she has been snacking more.  Has not checked her blood pressure much at home.  She has had some mild swelling in her ankles and was started on Spironolactone.  Her blood pressure is elevated today and she has not been checking it much at home.  She denies any chest pain palpitations, dizziness, syncope, PND, or orthopnea.    Previous Cardiac Testing and Procedures:  - Echo (14) EF 65%, grade 2 diastolic dysfunction, mild MR and TR, RVSP 48 mmHg  - Holter monitor (2017) 21,180 one atrial ectopic beats, runs of nonsustained SVT up to 5 beats at 167 bpm  - Echo (2017) EF 65%, normal diastolic function, normal valves  - Lipid panel (2018) total cholesterol 141, HDL 45, LDL 79, triglycerides 86  - Lipid panel (2/15/2019) total cholesterol 165, HDL 60, LDL 85, triglycerides 102    Patient Active Problem List   Diagnosis   • Dizziness   • Fatigue   • Syncope and collapse   • Paroxysmal SVT (supraventricular tachycardia) (CMS/HCC)   • Dyslipidemia   • Essential hypertension       Social History     Tobacco Use   • Smoking status: Former Smoker     Packs/day: 0.50     Years: 35.00     Pack years: 17.50     Types: Cigarettes     Last attempt to quit: 2019     Years since quittin.7   • Smokeless tobacco: Never Used   Substance Use Topics   • Alcohol use: Yes     Comment: rarely   • Drug use: No       Family History   Problem Relation Age of Onset   • Dementia Mother    • Hyperlipidemia Mother    • Heart disease Father    • Hyperlipidemia Father        The following portions of the patient's history were reviewed and updated as appropriate: allergies, current medications, past family history, past medical history, past social history, past surgical history and problem  "list.      Current Outpatient Medications:   •  atorvastatin (LIPITOR) 20 MG tablet, Take 20 mg by mouth Daily., Disp: , Rfl:   •  Calcium Carbonate (CALTRATE 600) 1500 (600 Ca) MG tablet, Take 600 mg by mouth Daily., Disp: , Rfl:   •  Calcium Carbonate-Vitamin D (CALTRATE 600+D PO), Take  by mouth 2 (Two) Times a Day., Disp: , Rfl:   •  cyclobenzaprine (FLEXERIL) 10 MG tablet, , Disp: , Rfl:   •  metoprolol succinate XL (TOPROL-XL) 25 MG 24 hr tablet, Take 25 mg by mouth Daily., Disp: , Rfl:   •  nicotine polacrilex (NICORETTE) 4 MG gum, Chew 4 mg As Needed for Smoking Cessation., Disp: , Rfl:   •  Probiotic Product (PROBIOTIC-10 PO), Take  by mouth., Disp: , Rfl:   •  ranitidine (ZANTAC) 300 MG capsule, Take 300 mg by mouth Every Evening., Disp: , Rfl:   •  spironolactone (ALDACTONE) 25 MG tablet, Take 25 mg by mouth., Disp: , Rfl:   •  vitamin B-12 (CYANOCOBALAMIN) 1000 MCG tablet, Take 1,000 mcg by mouth Daily., Disp: , Rfl:     Review of Systems   Constitution: Negative for chills and fever.   Cardiovascular: Negative for chest pain and paroxysmal nocturnal dyspnea.   Respiratory: Negative for cough and shortness of breath.    Skin: Negative for rash.   Gastrointestinal: Negative for abdominal pain and heartburn.   Neurological: Negative for dizziness and numbness.       Objective   /98 (BP Location: Left arm, Patient Position: Sitting, Cuff Size: Adult)   Pulse 74   Ht 160 cm (62.99\")   Wt 91.6 kg (202 lb)   SpO2 100%   BMI 35.79 kg/m²   Physical Exam   Constitutional: She is oriented to person, place, and time. She appears well-developed and well-nourished.   HENT:   Head: Normocephalic and atraumatic.   Cardiovascular: Normal rate, regular rhythm and normal heart sounds.   No murmur heard.  Pulmonary/Chest: Effort normal and breath sounds normal.   Musculoskeletal: She exhibits no edema.   Neurological: She is alert and oriented to person, place, and time.   Skin: Skin is warm and dry. "   Psychiatric: She has a normal mood and affect.       ECG 12 Lead  Date/Time: 10/30/2019 2:38 PM  Performed by: Adrian Escalera MD  Authorized by: Adrian Escalera MD   Comparison: compared with previous ECG from 5/11/2017  Similar to previous ECG  Rhythm: sinus rhythm  Rate: normal  Other findings: right atrial abnormality              ICD-10-CM ICD-9-CM   1. Paroxysmal SVT (supraventricular tachycardia) (CMS/HCC) I47.1 427.0   2. Essential hypertension I10 401.9   3. Dyslipidemia E78.5 272.4   4. Class 2 obesity due to excess calories without serious comorbidity with body mass index (BMI) of 35.0 to 35.9 in adult E66.09 278.00    Z68.35 V85.35         Assessment/Plan:  1. Paroxysmal SVT:   No significant palpitations or evidence of recurrent arrhythmias. Continue metoprolol.      2. Essential hypertension: Blood pressure is elevated today.  Encouraged her to monitor at home and will consider additional antihypertensive medications if it remains elevated.      3. Dyslipidemia:  Continue atorvastatin.      4. Obesity: Patient's Body mass index is 35.79 kg/m².  She has gained 33 pounds since last visit.  BMI is above normal parameters. Recommendations include: exercise counseling and nutrition counseling.

## 2019-12-16 ENCOUNTER — OFFICE VISIT (OUTPATIENT)
Dept: OTOLARYNGOLOGY | Facility: CLINIC | Age: 59
End: 2019-12-16

## 2019-12-16 ENCOUNTER — TELEPHONE (OUTPATIENT)
Dept: OTOLARYNGOLOGY | Facility: CLINIC | Age: 59
End: 2019-12-16

## 2019-12-16 VITALS
OXYGEN SATURATION: 98 % | HEIGHT: 63 IN | BODY MASS INDEX: 36.32 KG/M2 | SYSTOLIC BLOOD PRESSURE: 128 MMHG | HEART RATE: 70 BPM | RESPIRATION RATE: 17 BRPM | TEMPERATURE: 98.5 F | WEIGHT: 205 LBS | DIASTOLIC BLOOD PRESSURE: 72 MMHG

## 2019-12-16 DIAGNOSIS — R51.9 LEFT FACIAL PRESSURE AND PAIN: ICD-10-CM
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
## 2019-12-16 DIAGNOSIS — J30.9 ALLERGIC RHINITIS, UNSPECIFIED SEASONALITY, UNSPECIFIED TRIGGER: ICD-10-CM

## 2019-12-16 PROCEDURE — 99214 OFFICE O/P EST MOD 30 MIN: CPT | Performed by: NURSE PRACTITIONER

## 2019-12-16 RX ORDER — CEFDINIR 300 MG/1
300 CAPSULE ORAL
COMMUNITY
Start: 2019-12-12 | End: 2020-08-06

## 2019-12-16 RX ORDER — AZELASTINE 1 MG/ML
2 SPRAY, METERED NASAL 2 TIMES DAILY
Qty: 30 ML | Refills: 6 | Status: SHIPPED | OUTPATIENT
Start: 2019-12-16 | End: 2020-01-15

## 2019-12-16 RX ORDER — FLUTICASONE PROPIONATE 50 MCG
2 SPRAY, SUSPENSION (ML) NASAL DAILY
Qty: 1 BOTTLE | Refills: 6 | Status: SHIPPED | OUTPATIENT
Start: 2019-12-16 | End: 2020-01-15

## 2020-08-06 ENCOUNTER — OFFICE VISIT (OUTPATIENT)
Dept: CARDIOLOGY | Facility: CLINIC | Age: 60
End: 2020-08-06

## 2020-08-06 VITALS
WEIGHT: 212 LBS | SYSTOLIC BLOOD PRESSURE: 120 MMHG | HEIGHT: 63 IN | DIASTOLIC BLOOD PRESSURE: 94 MMHG | OXYGEN SATURATION: 98 % | BODY MASS INDEX: 37.56 KG/M2 | HEART RATE: 60 BPM

## 2020-08-06 DIAGNOSIS — E66.01 CLASS 2 SEVERE OBESITY DUE TO EXCESS CALORIES WITH SERIOUS COMORBIDITY AND BODY MASS INDEX (BMI) OF 37.0 TO 37.9 IN ADULT (HCC): ICD-10-CM

## 2020-08-06 DIAGNOSIS — I10 ESSENTIAL HYPERTENSION: ICD-10-CM

## 2020-08-06 DIAGNOSIS — E78.5 DYSLIPIDEMIA: ICD-10-CM

## 2020-08-06 DIAGNOSIS — I47.1 PAROXYSMAL SVT (SUPRAVENTRICULAR TACHYCARDIA) (HCC): Primary | ICD-10-CM

## 2020-08-06 PROBLEM — E66.812 CLASS 2 SEVERE OBESITY DUE TO EXCESS CALORIES WITH SERIOUS COMORBIDITY AND BODY MASS INDEX (BMI) OF 37.0 TO 37.9 IN ADULT: Status: ACTIVE | Noted: 2017-07-12

## 2020-08-06 PROCEDURE — 93000 ELECTROCARDIOGRAM COMPLETE: CPT | Performed by: INTERNAL MEDICINE

## 2020-08-06 PROCEDURE — 99214 OFFICE O/P EST MOD 30 MIN: CPT | Performed by: INTERNAL MEDICINE

## 2020-08-06 RX ORDER — FAMOTIDINE 40 MG/1
40 TABLET, FILM COATED ORAL 2 TIMES DAILY
COMMUNITY
Start: 2022-11-19 | End: 2023-02-15

## 2020-08-06 NOTE — PROGRESS NOTES
Reason for Visit: cardiovascular follow up.    HPI:  Shannon Coombs is a 60 y.o. female is here today for follow-up.  She has done relatively well from a cardiac standpoint.  She only has rare palpitations.  These are usually mild and self-limited.  She feels metoprolol is working.  Her biggest problem has been her persistent weight gain.  She previously lost about 60 pounds of weight.  Over the past year and a half she has been gradually putting it back on it is now almost back to where she was previous.  She has difficulty with her snacking particularly.  Her  likes to keep chocolate around.  She denies any chest pain, dizziness, syncope, PND, or orthopnea.  She reports mild lower extremity edema intermittently, usually worse at the end of the day she has been on her feet for extended periods of time.    Previous Cardiac Testing and Procedures:  - Echo (6/25/14) EF 65%, grade 2 diastolic dysfunction, mild MR and TR, RVSP 48 mmHg  - Holter monitor (04/14/2017) 21,180 one atrial ectopic beats, runs of nonsustained SVT up to 5 beats at 167 bpm  - Echo (05/16/2017) EF 65%, normal diastolic function, normal valves  - Lipid panel (07/28/2018) total cholesterol 141, HDL 45, LDL 79, triglycerides 86  - Lipid panel (2/15/2019) total cholesterol 165, HDL 60, LDL 85, triglycerides 102  - Lipid panel (6/8/2020) total cholesterol 168, HDL 64, LDL 91, triglycerides 65    Patient Active Problem List   Diagnosis   • Dizziness   • Fatigue   • Syncope and collapse   • Paroxysmal SVT (supraventricular tachycardia) (CMS/HCC)   • Class 2 severe obesity due to excess calories with serious comorbidity and body mass index (BMI) of 37.0 to 37.9 in adult (CMS/HCC)   • Dyslipidemia   • Essential hypertension       Social History     Tobacco Use   • Smoking status: Former Smoker     Packs/day: 0.50     Years: 35.00     Pack years: 17.50     Types: Cigarettes     Last attempt to quit: 2/2/2019     Years since quitting:  1.5   • Smokeless tobacco: Never Used   Substance Use Topics   • Alcohol use: Yes     Comment: rarely   • Drug use: No       Family History   Problem Relation Age of Onset   • Dementia Mother    • Hyperlipidemia Mother    • Heart disease Father    • Hyperlipidemia Father        The following portions of the patient's history were reviewed and updated as appropriate: allergies, current medications, past family history, past medical history, past social history, past surgical history and problem list.      Current Outpatient Medications:   •  atorvastatin (LIPITOR) 20 MG tablet, Take 20 mg by mouth Daily., Disp: , Rfl:   •  Calcium Carbonate (CALTRATE 600) 1500 (600 Ca) MG tablet, Take 600 mg by mouth Daily., Disp: , Rfl:   •  Calcium Carbonate-Vitamin D (CALTRATE 600+D PO), Take  by mouth 2 (Two) Times a Day., Disp: , Rfl:   •  cyclobenzaprine (FLEXERIL) 10 MG tablet, , Disp: , Rfl:   •  famotidine (PEPCID) 40 MG tablet, Take 40 mg by mouth Daily., Disp: , Rfl:   •  metoprolol succinate XL (TOPROL-XL) 25 MG 24 hr tablet, Take 25 mg by mouth Daily., Disp: , Rfl:   •  nicotine polacrilex (NICORETTE) 4 MG gum, Chew 4 mg As Needed for Smoking Cessation., Disp: , Rfl:   •  Probiotic Product (PROBIOTIC-10 PO), Take  by mouth., Disp: , Rfl:   •  spironolactone (ALDACTONE) 25 MG tablet, Take 25 mg by mouth., Disp: , Rfl:   •  vitamin B-12 (CYANOCOBALAMIN) 1000 MCG tablet, Take 1,000 mcg by mouth Daily., Disp: , Rfl:     Review of Systems   Constitution: Positive for weight gain. Negative for chills and fever.   Cardiovascular: Positive for leg swelling and palpitations. Negative for chest pain and paroxysmal nocturnal dyspnea.   Respiratory: Negative for cough and shortness of breath.    Skin: Negative for rash.   Gastrointestinal: Negative for abdominal pain and heartburn.   Neurological: Negative for dizziness and numbness.       Objective   /94 (BP Location: Left arm, Patient Position: Sitting, Cuff Size: Adult)    "Pulse 60   Ht 160 cm (62.99\")   Wt 96.2 kg (212 lb)   SpO2 98%   BMI 37.56 kg/m²   Physical Exam   Constitutional: She is oriented to person, place, and time. She appears well-developed and well-nourished.   HENT:   Head: Normocephalic and atraumatic.   Cardiovascular: Normal rate, regular rhythm and normal heart sounds.   No murmur heard.  Pulmonary/Chest: Effort normal and breath sounds normal.   Abdominal: She exhibits distension (obese).   Musculoskeletal: She exhibits edema (trace LE edema bilaterally).   Neurological: She is alert and oriented to person, place, and time.   Skin: Skin is warm and dry.   Psychiatric: She has a normal mood and affect.       ECG 12 Lead  Date/Time: 8/6/2020 3:28 PM  Performed by: Adrian Escalera MD  Authorized by: Adrian Escalera MD   Comparison: compared with previous ECG from 10/30/2019  Similar to previous ECG  Rhythm: sinus rhythm  Rate: normal  Other findings: non-specific ST-T wave changes              ICD-10-CM ICD-9-CM   1. Paroxysmal SVT (supraventricular tachycardia) (CMS/[facility]) I47.1 427.0   2. Essential hypertension I10 401.9   3. Dyslipidemia E78.5 272.4   4. Class 2 severe obesity due to excess calories with serious comorbidity and body mass index (BMI) of 37.0 to 37.9 in adult (CMS/[facility]) E66.01 278.01    Z68.37 V85.37         Assessment/Plan:  1. Paroxysmal SVT: Only mild self-limited palpitations.  Continue metoprolol.        2. Essential hypertension: Systolic blood pressure is normal, diastolic blood pressure is elevated at 94 mmHg today.  Usually well controlled at home.  Continue to monitor.      3. Dyslipidemia:   Reasonably well-controlled on atorvastatin.      4. Obesity: Patient's Body mass index is 37.56 kg/m².  Weight is up 10 pounds since last visit in October.  She is at 33 pounds and suggested prior to that.  BMI is above normal parameters. Recommendations include: exercise counseling and nutrition counseling.  Refer to a dietitian.     "

## 2020-08-13 ENCOUNTER — TELEPHONE (OUTPATIENT)
Dept: CARDIOLOGY | Facility: CLINIC | Age: 60
End: 2020-08-13

## 2021-09-01 ENCOUNTER — OFFICE VISIT (OUTPATIENT)
Dept: CARDIOLOGY | Facility: CLINIC | Age: 61
End: 2021-09-01

## 2021-09-01 VITALS
SYSTOLIC BLOOD PRESSURE: 120 MMHG | BODY MASS INDEX: 37.92 KG/M2 | WEIGHT: 214 LBS | HEART RATE: 71 BPM | DIASTOLIC BLOOD PRESSURE: 78 MMHG | HEIGHT: 63 IN | OXYGEN SATURATION: 98 %

## 2021-09-01 DIAGNOSIS — E66.01 CLASS 2 SEVERE OBESITY DUE TO EXCESS CALORIES WITH SERIOUS COMORBIDITY AND BODY MASS INDEX (BMI) OF 37.0 TO 37.9 IN ADULT (HCC): ICD-10-CM

## 2021-09-01 DIAGNOSIS — I47.1 PAROXYSMAL SVT (SUPRAVENTRICULAR TACHYCARDIA) (HCC): Primary | ICD-10-CM

## 2021-09-01 DIAGNOSIS — E78.5 DYSLIPIDEMIA: ICD-10-CM

## 2021-09-01 DIAGNOSIS — I10 ESSENTIAL HYPERTENSION: ICD-10-CM

## 2021-09-01 PROCEDURE — 93000 ELECTROCARDIOGRAM COMPLETE: CPT | Performed by: INTERNAL MEDICINE

## 2021-09-01 PROCEDURE — 99214 OFFICE O/P EST MOD 30 MIN: CPT | Performed by: INTERNAL MEDICINE

## 2021-09-01 NOTE — PROGRESS NOTES
Reason for Visit: cardiovascular follow up.    HPI:  Shannon Coombs is a 61 y.o. female is here today for follow-up.  She reports doing well.  She is starting to get more exercise and notes she is starting to lose weight.  She denies any chest pain, dizziness, syncope, PND, or orthopnea.  Her palpitations are very mild, rare, and brief.  She has some fluctuations in her blood pressure, particular when she is active.  She is not smoking but does still use the nicorette gum.  Her lipid panel in March showed worsening cholesterol.  She thinks this is due to eating worse around that time.  She thinks it should be getting better with her lifestyle changes.      Previous Cardiac Testing and Procedures:  - Echo (14) EF 65%, grade 2 diastolic dysfunction, mild MR and TR, RVSP 48 mmHg  - Holter monitor (2017) 21,180 one atrial ectopic beats, runs of nonsustained SVT up to 5 beats at 167 bpm  - Echo (2017) EF 65%, normal diastolic function, normal valves  - Lipid panel (2018) total cholesterol 141, HDL 45, LDL 79, triglycerides 86  - Lipid panel (2/15/2019) total cholesterol 165, HDL 60, LDL 85, triglycerides 102  - Lipid panel (2020) total cholesterol 168, HDL 64, LDL 91, triglycerides 65  -Lipid panel (3/10/2021) total cholesterol 199, HDL 63, , triglycerides 78    Patient Active Problem List   Diagnosis   • Dizziness   • Fatigue   • Syncope and collapse   • Paroxysmal SVT (supraventricular tachycardia) (CMS/[facility])   • Class 2 severe obesity due to excess calories with serious comorbidity and body mass index (BMI) of 37.0 to 37.9 in adult (CMS/HCC)   • Dyslipidemia   • Essential hypertension       Social History     Tobacco Use   • Smoking status: Former Smoker     Packs/day: 0.50     Years: 35.00     Pack years: 17.50     Types: Cigarettes     Quit date: 2019     Years since quittin.5   • Smokeless tobacco: Never Used   Substance Use Topics   • Alcohol use: Yes      "Comment: rarely   • Drug use: No       Family History   Problem Relation Age of Onset   • Dementia Mother    • Hyperlipidemia Mother    • Heart disease Father    • Hyperlipidemia Father        The following portions of the patient's history were reviewed and updated as appropriate: allergies, current medications, past family history, past medical history, past social history, past surgical history and problem list.      Current Outpatient Medications:   •  atorvastatin (LIPITOR) 40 MG tablet, Take 40 mg by mouth Daily., Disp: , Rfl:   •  Calcium Carbonate (CALTRATE 600) 1500 (600 Ca) MG tablet, Take 600 mg by mouth Daily., Disp: , Rfl:   •  cyclobenzaprine (FLEXERIL) 10 MG tablet, , Disp: , Rfl:   •  famotidine (PEPCID) 40 MG tablet, Take 40 mg by mouth Daily., Disp: , Rfl:   •  metoprolol succinate XL (TOPROL-XL) 25 MG 24 hr tablet, Take 25 mg by mouth Daily., Disp: , Rfl:   •  nicotine polacrilex (NICORETTE) 4 MG gum, Chew 4 mg As Needed for Smoking Cessation., Disp: , Rfl:   •  Probiotic Product (PROBIOTIC-10 PO), Take  by mouth., Disp: , Rfl:   •  spironolactone (ALDACTONE) 25 MG tablet, Take 25 mg by mouth., Disp: , Rfl:   •  vitamin B-12 (CYANOCOBALAMIN) 1000 MCG tablet, Take 1,000 mcg by mouth Daily., Disp: , Rfl:   •  Calcium Carbonate-Vitamin D (CALTRATE 600+D PO), Take  by mouth 2 (Two) Times a Day., Disp: , Rfl:     Review of Systems   Constitutional: Negative for chills and fever.   Cardiovascular: Positive for palpitations. Negative for chest pain and paroxysmal nocturnal dyspnea.   Respiratory: Negative for cough and shortness of breath.    Skin: Negative for rash.   Gastrointestinal: Negative for abdominal pain and heartburn.   Neurological: Negative for dizziness and numbness.       Objective   /78 (BP Location: Left arm, Patient Position: Sitting, Cuff Size: Adult)   Pulse 71   Ht 160 cm (63\")   Wt 97.1 kg (214 lb)   SpO2 98%   BMI 37.91 kg/m²   Constitutional:       Appearance: " Well-developed. Obese.   HENT:      Head: Normocephalic and atraumatic.   Pulmonary:      Effort: Pulmonary effort is normal.      Breath sounds: Normal breath sounds.   Cardiovascular:      Normal rate. Regular rhythm.      Murmurs: There is no murmur.      No gallop. No click.   Edema:     Peripheral edema absent.   Skin:     General: Skin is warm and dry.   Neurological:      Mental Status: Alert and oriented to person, place, and time.         ECG 12 Lead    Date/Time: 9/1/2021 10:41 AM  Performed by: Adrian Escalera MD  Authorized by: Adrian Escalera MD   Comparison: compared with previous ECG from 8/6/2020  Similar to previous ECG  Rhythm: sinus rhythm  Rate: normal  Other findings: right atrial abnormality              ICD-10-CM ICD-9-CM   1. Paroxysmal SVT (supraventricular tachycardia) (CMS/[facility])  I47.1 427.0   2. Essential hypertension  I10 401.9   3. Dyslipidemia  E78.5 272.4   4. Class 2 severe obesity due to excess calories with serious comorbidity and body mass index (BMI) of 37.0 to 37.9 in adult (CMS/[facility])  E66.01 278.01    Z68.37 V85.37         Assessment/Plan:  1.  Paroxysmal SVT: Palpitations are reasonably well controlled on metoprolol.      2. Essential hypertension:  Blood pressure is well controlled.  Continue metoprolol and spironolactone.      3. Dyslipidemia:  Total and LDL cholesterol have both increased significantly over the past year.  Continue atorvastatin.  Counseled on lifestyle modification.      4. Obesity: Patient's Body mass index is 37.91 kg/m². indicating that she is obese (BMI >30). Obesity-related health conditions include the following: hypertension and dyslipidemias. Obesity is unchanged. BMI is is above average; BMI management plan is completed. We discussed portion control and increasing exercise..

## 2021-09-09 ENCOUNTER — PATIENT ROUNDING (BHMG ONLY) (OUTPATIENT)
Dept: CARDIOLOGY | Facility: CLINIC | Age: 61
End: 2021-09-09

## 2021-09-09 NOTE — PROGRESS NOTES
September 9, 2021    Hello, may I speak with Shannon Coombs?    My name is Crissy      I am  with [facility]  [address]  [place] [address] [phone].    Before we get started may I verify your date of birth? 1960    I am calling to officially welcome you to our practice and ask about your recent visit. Is this a good time to talk? No -Message Left     Tell me about your visit with us. What things went well?         We're always looking for ways to make our patients' experiences even better. Do you have recommendations on ways we may improve?      Overall were you satisfied with your first visit to our practice?     I appreciate you taking the time to speak with me today. Is there anything else I can do for you?       Thank you, and have a great day.

## 2022-08-10 ENCOUNTER — TELEPHONE (OUTPATIENT)
Dept: CARDIOLOGY | Facility: CLINIC | Age: 62
End: 2022-08-10

## 2022-08-10 NOTE — TELEPHONE ENCOUNTER
----- Message from Adrian Escalera MD sent at 8/9/2022  4:29 PM CDT -----  It looks like it has been almost a year since her last office visit.  Please move up her upcoming yearly follow-up for preoperative risk evaluation.  Okay to overbook if necessary.  ----- Message -----  From: Radha Smyth MA  Sent: 8/9/2022   4:26 PM CDT  To: Adrian Escalera MD    Please advise     ----- Message -----  From: Brianna Montoya RegSched Rep  Sent: 8/9/2022   3:06 PM CDT  To: Adrian Escalera MD, Radha Smyth MA    Cardiac clearance requested. KA. Patient next appt 9-7-22

## 2022-08-24 ENCOUNTER — OFFICE VISIT (OUTPATIENT)
Dept: CARDIOLOGY | Facility: CLINIC | Age: 62
End: 2022-08-24

## 2022-08-24 VITALS
BODY MASS INDEX: 38.45 KG/M2 | OXYGEN SATURATION: 98 % | SYSTOLIC BLOOD PRESSURE: 150 MMHG | WEIGHT: 217 LBS | HEIGHT: 63 IN | DIASTOLIC BLOOD PRESSURE: 82 MMHG | HEART RATE: 83 BPM

## 2022-08-24 DIAGNOSIS — E66.01 SEVERE OBESITY (BMI 35.0-39.9) WITH COMORBIDITY: ICD-10-CM

## 2022-08-24 DIAGNOSIS — I47.1 PAROXYSMAL SVT (SUPRAVENTRICULAR TACHYCARDIA): Primary | ICD-10-CM

## 2022-08-24 DIAGNOSIS — I10 ESSENTIAL HYPERTENSION: ICD-10-CM

## 2022-08-24 DIAGNOSIS — Z01.818 PREOPERATIVE EVALUATION TO RULE OUT SURGICAL CONTRAINDICATION: ICD-10-CM

## 2022-08-24 DIAGNOSIS — E78.5 DYSLIPIDEMIA: ICD-10-CM

## 2022-08-24 PROCEDURE — 99214 OFFICE O/P EST MOD 30 MIN: CPT | Performed by: INTERNAL MEDICINE

## 2022-08-24 PROCEDURE — 93000 ELECTROCARDIOGRAM COMPLETE: CPT | Performed by: INTERNAL MEDICINE

## 2022-08-24 RX ORDER — SENNOSIDES 8.6 MG
CAPSULE ORAL
COMMUNITY
End: 2022-11-19 | Stop reason: ALTCHOICE

## 2022-08-24 NOTE — PROGRESS NOTES
Reason for Visit: cardiovascular follow up.    HPI:  Shannon Coombs is a 62 y.o. female is here today for 1 year follow-up.  She fell about a month ago.  She denies any loss of consciousness but did have significant bruising and discomfort.  She fortunately did not have any broken bones.  She denies any chest pain, syncope, PND, or orthopnea.  She has been emotional lately following the fall.  She does get intermittent palpitations.  She is going to have knee replacement in October with Dr Weems.      Previous Cardiac Testing and Procedures:  - Echo (6/25/14) EF 65%, grade 2 diastolic dysfunction, mild MR and TR, RVSP 48 mmHg  - Holter monitor (04/14/2017) 21,180 one atrial ectopic beats, runs of nonsustained SVT up to 5 beats at 167 bpm  - Echo (05/16/2017) EF 65%, normal diastolic function, normal valves  - Lipid panel (07/28/2018) total cholesterol 141, HDL 45, LDL 79, triglycerides 86  - Lipid panel (2/15/2019) total cholesterol 165, HDL 60, LDL 85, triglycerides 102  - Lipid panel (6/8/2020) total cholesterol 168, HDL 64, LDL 91, triglycerides 65  - Lipid panel (3/10/2021) total cholesterol 199, HDL 63, , triglycerides 78    Patient Active Problem List   Diagnosis   • Dizziness   • Fatigue   • Syncope and collapse   • Paroxysmal SVT (supraventricular tachycardia) (HCC)   • Severe obesity (BMI 35.0-39.9) with comorbidity (HCC)   • Dyslipidemia   • Essential hypertension       Social History     Tobacco Use   • Smoking status: Former Smoker     Packs/day: 0.50     Years: 35.00     Pack years: 17.50     Types: Cigarettes     Quit date: 2/2/2019     Years since quitting: 3.5   • Smokeless tobacco: Never Used   Substance Use Topics   • Alcohol use: Yes     Comment: rarely   • Drug use: No       Family History   Problem Relation Age of Onset   • Dementia Mother    • Hyperlipidemia Mother    • Heart disease Father    • Hyperlipidemia Father        The following portions of the patient's history  "were reviewed and updated as appropriate: allergies, current medications, past family history, past medical history, past social history, past surgical history and problem list.      Current Outpatient Medications:   •  acetaminophen (TYLENOL) 650 MG 8 hr tablet, Tylenol Arthritis Pain 650 mg tablet,extended release  Take 2 tablets every day by oral route., Disp: , Rfl:   •  atorvastatin (LIPITOR) 40 MG tablet, Take 40 mg by mouth Daily., Disp: , Rfl:   •  Calcium Carbonate 1500 (600 Ca) MG tablet, Take 600 mg by mouth Daily., Disp: , Rfl:   •  Calcium Carbonate-Vitamin D (CALTRATE 600+D PO), Caltrate 600 plus D  Caltrate 600 + D oral, Disp: , Rfl:   •  cyclobenzaprine (FLEXERIL) 10 MG tablet, , Disp: , Rfl:   •  famotidine (PEPCID) 40 MG tablet, Take 40 mg by mouth Daily., Disp: , Rfl:   •  metoprolol succinate XL (TOPROL-XL) 25 MG 24 hr tablet, Take 25 mg by mouth Daily., Disp: , Rfl:   •  nicotine polacrilex (NICORETTE) 4 MG gum, Chew 4 mg As Needed for Smoking Cessation., Disp: , Rfl:   •  Probiotic Product (PROBIOTIC-10 PO), Take  by mouth., Disp: , Rfl:   •  spironolactone (ALDACTONE) 25 MG tablet, Take 25 mg by mouth., Disp: , Rfl:   •  vitamin B-12 (CYANOCOBALAMIN) 1000 MCG tablet, Take 1,000 mcg by mouth Daily., Disp: , Rfl:     Review of Systems   Constitutional: Positive for malaise/fatigue. Negative for chills and fever.   Cardiovascular: Positive for leg swelling. Negative for chest pain and paroxysmal nocturnal dyspnea.   Respiratory: Negative for cough and shortness of breath.    Skin: Negative for rash.   Musculoskeletal: Positive for arthritis, falls, joint swelling and muscle weakness.   Gastrointestinal: Negative for abdominal pain and heartburn.   Neurological: Positive for disturbances in coordination. Negative for dizziness and numbness.       Objective   /82 (BP Location: Left arm, Patient Position: Sitting, Cuff Size: Adult)   Pulse 83   Ht 160 cm (62.99\")   Wt 98.4 kg (217 lb)   " SpO2 98%   BMI 38.45 kg/m²   Constitutional:       Appearance: Well-developed. Obese.   HENT:      Head: Normocephalic and atraumatic.   Pulmonary:      Effort: Pulmonary effort is normal.      Breath sounds: Normal breath sounds.   Cardiovascular:      Normal rate. Regular rhythm.      Murmurs: There is no murmur.      No gallop. No click.   Edema:     Peripheral edema present.     Pretibial: bilateral trace edema of the pretibial area.  Skin:     General: Skin is warm and dry.   Neurological:      Mental Status: Alert and oriented to person, place, and time.         ECG 12 Lead    Date/Time: 8/24/2022 11:52 AM  Performed by: Adrian Escalera MD  Authorized by: Adrian Escalera MD   Comparison: compared with previous ECG from 9/1/2021  Similar to previous ECG  Rhythm: sinus rhythm  Rate: normal  Other findings: non-specific ST-T wave changes              ICD-10-CM ICD-9-CM   1. Paroxysmal SVT (supraventricular tachycardia) ([facility])  I47.1 427.0   2. Essential hypertension  I10 401.9   3. Dyslipidemia  E78.5 272.4   4. Severe obesity (BMI 35.0-39.9) with comorbidity (HCC)  E66.01 278.01   5. Preoperative evaluation to rule out surgical contraindication  Z01.818 V72.83         Assessment/Plan:  1.  Paroxysmal SVT: Only mild and self-limited palpitations.  Continue metoprolol.      2. Essential hypertension:  Blood pressure is elevated today.  Encouraged her to keep a log at home and bring it into next follow-up.  Continue metoprolol and spironolactone.      3. Dyslipidemia:   Borderline control lipid panel from 3/10/2021.  Continue atorvastatin.      4. Obesity: Class 2 Severe Obesity (BMI >=35 and <=39.9). Obesity-related health conditions include the following: hypertension and dyslipidemias. Obesity is worsening. BMI is is above average; BMI management plan is completed. We discussed portion control and increasing exercise.     5.  Preoperative evaluation: Patient is a low to intermediate risk surgical candidate  from a cardiac standpoint.  Her functional status is borderline.  She has no cardiac symptoms.  Echocardiogram from 5/16/2017 showed a structurally normal heart.  No further cardiac testing is indicated prior to surgery.

## 2022-11-18 ENCOUNTER — TRANSCRIBE ORDERS (OUTPATIENT)
Dept: HOME HEALTH SERVICES | Facility: HOME HEALTHCARE | Age: 62
End: 2022-11-18

## 2022-11-18 ENCOUNTER — HOME HEALTH ADMISSION (OUTPATIENT)
Dept: HOME HEALTH SERVICES | Facility: HOME HEALTHCARE | Age: 62
End: 2022-11-18
Payer: COMMERCIAL

## 2022-11-18 DIAGNOSIS — Z96.652 AFTERCARE FOLLOWING LEFT KNEE JOINT REPLACEMENT SURGERY: Primary | ICD-10-CM

## 2022-11-18 DIAGNOSIS — Z47.1 AFTERCARE FOLLOWING LEFT KNEE JOINT REPLACEMENT SURGERY: Primary | ICD-10-CM

## 2022-11-19 ENCOUNTER — HOME CARE VISIT (OUTPATIENT)
Dept: HOME HEALTH SERVICES | Facility: CLINIC | Age: 62
End: 2022-11-19
Payer: COMMERCIAL

## 2022-11-19 PROCEDURE — G0151 HHCP-SERV OF PT,EA 15 MIN: HCPCS

## 2022-11-19 NOTE — HOME HEALTH
SOC Note:OA L knee, L TKA    Home Health ordered for: PT    Reason for Hosp/Primary Dx/Co-morbidities: L TKA, OA, HTN    Focus of Care: L TKA protocol    Current Functional status/mobility/DME: see DME    HB status/Living Arrangements: Lives with spouse.    Skin Integrity/wound status: anterior knee surgical incision covered with dressing    Code Status: FULL CODE    Fall Risk: high falls risk    POC confirmed withreferral from Dr Kentrell Weems on 11/18/22.    Plan for next visit: TKA protocol

## 2022-11-20 ENCOUNTER — HOME CARE VISIT (OUTPATIENT)
Dept: HOME HEALTH SERVICES | Facility: HOME HEALTHCARE | Age: 62
End: 2022-11-20
Payer: COMMERCIAL

## 2022-11-20 VITALS
BODY MASS INDEX: 39.16 KG/M2 | HEART RATE: 53 BPM | SYSTOLIC BLOOD PRESSURE: 120 MMHG | TEMPERATURE: 98.2 F | OXYGEN SATURATION: 99 % | HEIGHT: 63 IN | DIASTOLIC BLOOD PRESSURE: 78 MMHG | WEIGHT: 221 LBS | RESPIRATION RATE: 18 BRPM

## 2022-11-21 ENCOUNTER — HOME CARE VISIT (OUTPATIENT)
Dept: HOME HEALTH SERVICES | Facility: CLINIC | Age: 62
End: 2022-11-21
Payer: COMMERCIAL

## 2022-11-21 VITALS
TEMPERATURE: 99 F | RESPIRATION RATE: 18 BRPM | OXYGEN SATURATION: 97 % | HEART RATE: 77 BPM | SYSTOLIC BLOOD PRESSURE: 128 MMHG | DIASTOLIC BLOOD PRESSURE: 80 MMHG

## 2022-11-21 PROCEDURE — G0299 HHS/HOSPICE OF RN EA 15 MIN: HCPCS

## 2022-11-21 NOTE — HOME HEALTH
SN Evaluation: 62 yr old female with history of HTN and  Hyperlipidemia had left knee joint replacement and discharged home with [facility] health to follow up. PT admit done on 11/19/22. Patient reports having issues with constipation since being home but started taking miralax daily per MD orders and had large soft BM this AM.Bowel sounds active and normal in all quadrants.  Patient reports knee pain managed with current pain regimen. Surgical dressing moist with small amount of blood tinged drainaged noted. Dressing removed and patient gave 100% adequate return demostration of wound care. see wound assessment for details. SN instructed patient on S/S of infection to report to SN/MD, Pain mangement techniques,action,side effects,purpose of aspirin, hydrocodone,flexeril, and to call [facility] with any changes or concerns. Patient voiced understanding    Home Health ordered for: SN 1W9    Focus of Care: SURGICAL WOUND ASSESSMENT/CARE, education on HTN, constipation management, fall prevention measures, and pain management techniques        Current Functional status/mobility/DME: walker,cane, bath chair    HB status/Living Arrangements: lives with spouse    Skin Integrity/wound status: surgical wound to left knee    Code Status: full code    Fall Risk: high    POC confirmed with Dr Weems on 11/21/22    Plan for next visit: wound assessment and continue education

## 2022-11-22 ENCOUNTER — HOME CARE VISIT (OUTPATIENT)
Dept: HOME HEALTH SERVICES | Facility: CLINIC | Age: 62
End: 2022-11-22
Payer: COMMERCIAL

## 2022-11-22 VITALS
OXYGEN SATURATION: 99 % | DIASTOLIC BLOOD PRESSURE: 80 MMHG | TEMPERATURE: 98.2 F | HEART RATE: 77 BPM | RESPIRATION RATE: 18 BRPM | SYSTOLIC BLOOD PRESSURE: 162 MMHG

## 2022-11-22 PROCEDURE — G0157 HHC PT ASSISTANT EA 15: HCPCS

## 2022-11-22 NOTE — HOME HEALTH
Covid 19 pre-screen performed.Pt states no falls or changes to insurance or medicaiton since last visit. Next visit to ed pt on HEP, gt mechanics, and balance.

## 2022-11-23 ENCOUNTER — HOME CARE VISIT (OUTPATIENT)
Dept: HOME HEALTH SERVICES | Facility: CLINIC | Age: 62
End: 2022-11-23
Payer: COMMERCIAL

## 2022-11-23 VITALS
HEART RATE: 75 BPM | SYSTOLIC BLOOD PRESSURE: 140 MMHG | OXYGEN SATURATION: 98 % | TEMPERATURE: 98 F | DIASTOLIC BLOOD PRESSURE: 80 MMHG | RESPIRATION RATE: 18 BRPM

## 2022-11-23 PROCEDURE — G0152 HHCP-SERV OF OT,EA 15 MIN: HCPCS

## 2022-11-23 NOTE — HOME HEALTH
DX: OA L knee, L TKA  SURGICAL PROCEDURE: left TKA  PRECAUTIONS: fall precautions  OXYGEN USE: no  EQUIPMENT: walker , BSC, shower chair, rails over toilet - pateint getting tub bench and hand held shower  PRIOR LEVEL OF FUNCTION: used walker after fall in July as needed, right knee gives out at times, hx of falls  MEDICAL NECESSITY: skilled OT for aDL training on safety, fall prevention, AE use  PATIENT GOALS: to shower and dress independently  DATE OF NEXT APPOINTMENT WITH DOCTOR:  Dr. Weems Dec 1st  ANTICIPATED DISCHARGE PLAN: to self care when goals met  PATIENT/CAREGIVER AGREE WITH DISCHARGE PLAN: yes  SPECIFIC INTERVENTIONS AND GOALS TO ADDRESS ON NEXT VISIT: training on safety with shower transfer  FREQUENCY AND DURATION: 1 wk 1, 0 wk 1, 1 wk 1  ANY OTHER FOLLOW UP NEEDED: no      PATIENT HAS RECEIVED EDUCATION ON COVID-19, PREVENTION, HANDWASHING, SOCIAL DISTANCING PER CDC

## 2022-11-25 ENCOUNTER — HOME CARE VISIT (OUTPATIENT)
Dept: HOME HEALTH SERVICES | Facility: CLINIC | Age: 62
End: 2022-11-25
Payer: COMMERCIAL

## 2022-11-25 VITALS
OXYGEN SATURATION: 100 % | DIASTOLIC BLOOD PRESSURE: 86 MMHG | SYSTOLIC BLOOD PRESSURE: 142 MMHG | HEART RATE: 82 BPM | RESPIRATION RATE: 16 BRPM

## 2022-11-25 PROCEDURE — G0151 HHCP-SERV OF PT,EA 15 MIN: HCPCS

## 2022-11-26 NOTE — HOME HEALTH
Patient reports feeling well today. DEnies falls, denies medication changes, denies  signs and symptoms of COVID. Reports compliance with HEP. Reports L knee pain with weight bearing activities. Worked today on L knee ROM, quads strengthening. Upgraded HEP. Worked on improvement with gait mechanics with cues for L knee extension with heel strike and in stance, equal weight distribution. Education was provided on benefits of compliance with HEP and frequent mobility.  Planning to continnue to rpogress HEP, to work on ROM, gait and standing balance.

## 2022-11-28 ENCOUNTER — HOME CARE VISIT (OUTPATIENT)
Dept: HOME HEALTH SERVICES | Facility: CLINIC | Age: 62
End: 2022-11-28
Payer: COMMERCIAL

## 2022-11-28 VITALS
HEART RATE: 73 BPM | DIASTOLIC BLOOD PRESSURE: 78 MMHG | TEMPERATURE: 97.9 F | OXYGEN SATURATION: 98 % | SYSTOLIC BLOOD PRESSURE: 158 MMHG | RESPIRATION RATE: 18 BRPM

## 2022-11-28 PROCEDURE — G0157 HHC PT ASSISTANT EA 15: HCPCS

## 2022-11-28 NOTE — HOME HEALTH
Covid 19 pre-screen performed.Pt states no falls or changes to insurance or medicaiton since last visit. Next visit to address gt mechanics, HEP, and ROM.

## 2022-11-30 ENCOUNTER — HOME CARE VISIT (OUTPATIENT)
Dept: HOME HEALTH SERVICES | Facility: CLINIC | Age: 62
End: 2022-11-30
Payer: COMMERCIAL

## 2022-11-30 VITALS
OXYGEN SATURATION: 98 % | RESPIRATION RATE: 18 BRPM | DIASTOLIC BLOOD PRESSURE: 74 MMHG | HEART RATE: 72 BPM | TEMPERATURE: 98.3 F | SYSTOLIC BLOOD PRESSURE: 130 MMHG

## 2022-11-30 PROCEDURE — G0157 HHC PT ASSISTANT EA 15: HCPCS

## 2022-11-30 NOTE — HOME HEALTH
Covid 19 pre-screen performed.Pt states no falls or changes to insurance or medicaiton since last visit Pt c/o cont'd severe pain in L knee and has edema. T/I pt in edema management using pillows to elevate LE. Next visit to address gt mechanics, progressive HEP, and ROM.

## 2022-12-02 ENCOUNTER — HOME CARE VISIT (OUTPATIENT)
Dept: HOME HEALTH SERVICES | Facility: CLINIC | Age: 62
End: 2022-12-02
Payer: COMMERCIAL

## 2022-12-02 PROCEDURE — G0300 HHS/HOSPICE OF LPN EA 15 MIN: HCPCS

## 2022-12-05 ENCOUNTER — HOME CARE VISIT (OUTPATIENT)
Dept: HOME HEALTH SERVICES | Facility: CLINIC | Age: 62
End: 2022-12-05
Payer: COMMERCIAL

## 2022-12-05 VITALS
TEMPERATURE: 97 F | DIASTOLIC BLOOD PRESSURE: 76 MMHG | OXYGEN SATURATION: 98 % | SYSTOLIC BLOOD PRESSURE: 132 MMHG | RESPIRATION RATE: 18 BRPM | HEART RATE: 82 BPM

## 2022-12-05 VITALS
HEART RATE: 76 BPM | DIASTOLIC BLOOD PRESSURE: 92 MMHG | OXYGEN SATURATION: 96 % | SYSTOLIC BLOOD PRESSURE: 150 MMHG | TEMPERATURE: 98.2 F | RESPIRATION RATE: 18 BRPM

## 2022-12-05 PROCEDURE — G0157 HHC PT ASSISTANT EA 15: HCPCS

## 2022-12-05 NOTE — HOME HEALTH
Covid 19 pre-screen performed.Pt states no falls or changes to insurance or medicaiton since last visit. Next visit to address gt mechanics w/STC, resistive band ex, and ROM. Discussed progress and d/c planning. Pt requests to go to outpatient therapy after d/c from Home Health.

## 2022-12-06 ENCOUNTER — HOME CARE VISIT (OUTPATIENT)
Dept: HOME HEALTH SERVICES | Facility: CLINIC | Age: 62
End: 2022-12-06
Payer: COMMERCIAL

## 2022-12-06 VITALS
OXYGEN SATURATION: 98 % | SYSTOLIC BLOOD PRESSURE: 130 MMHG | HEART RATE: 70 BPM | DIASTOLIC BLOOD PRESSURE: 70 MMHG | RESPIRATION RATE: 18 BRPM | TEMPERATURE: 97.2 F

## 2022-12-06 VITALS
HEART RATE: 83 BPM | TEMPERATURE: 98.2 F | OXYGEN SATURATION: 97 % | RESPIRATION RATE: 18 BRPM | SYSTOLIC BLOOD PRESSURE: 150 MMHG | DIASTOLIC BLOOD PRESSURE: 85 MMHG

## 2022-12-06 PROCEDURE — G0152 HHCP-SERV OF OT,EA 15 MIN: HCPCS

## 2022-12-06 PROCEDURE — G0300 HHS/HOSPICE OF LPN EA 15 MIN: HCPCS

## 2022-12-06 NOTE — HOME HEALTH
COVID SCREENING: no signs/symptoms of COVID    SUBJECTIVE: reports she got a reacher and is independent with donning shoes/socks    FOCUS OF CARE/SKILLED NEED: ADL training on safety with shower transfers, use of AE, safety and fall prevention during home management tasks    TEACHING/INTERVENTIONS:  ADL training on safety with shower transfers, use of AE, safety and fall prevention during home management tasks      PATIENT/CG RESPONSE: Patient demo independence with shower transfers using tub bench. Verbalized good understanding of education on use of long handle sponge, safety and fall prevention during shower. Patient reports independence with dressing using AE. Demo good safety with kitchen tasks. Verbalized understanding of use of AE during laundry tasks and safety when caring for pet.     PROGRESS TOWARD GOALS: met    PHYSICIAN CONTACT: to be scheduled    FALLS SINCE LAST VISIT? no    MEDICATION CHANGES SINCE LAST VISIT? no

## 2022-12-06 NOTE — HOME HEALTH
FOCUS OF CARE/SKILLED NEED: wound assessment, fall prevention, pain management  TEACHING/INTERVENTIONS: wound assessment, fall prevention, pain management  PROGRESS TOWARD GOALS: ongoing  PHYSICIAN CONTACT:  no  INSURANCE CHANGES?  no  FALLS SINCE LAST VISIT?  no  MEDICATION CHANGES? no  PLAN FOR NEXT VISIT: wound assessment, fall prevention, pain management  PRE-SCREENED FOR COVID? no s/s of COVID voiced or noted

## 2022-12-06 NOTE — HOME HEALTH
FOCUS OF CARE/SKILLED NEED: wound assessment, fall prevention,  TEACHING/INTERVENTIONS: wound assessment, fall prevention,  PROGRESS TOWARD GOALS: ongoing  PHYSICIAN CONTACT:  no  INSURANCE CHANGES?  no  FALLS SINCE LAST VISIT?  no  MEDICATION CHANGES? no  PLAN FOR NEXT VISIT: wound assessment,  fall prevention  PRE-SCREENED FOR COVID? no s/s of COVID voiced or noted

## 2022-12-07 ENCOUNTER — HOME CARE VISIT (OUTPATIENT)
Dept: HOME HEALTH SERVICES | Facility: CLINIC | Age: 62
End: 2022-12-07
Payer: COMMERCIAL

## 2022-12-07 VITALS
TEMPERATURE: 98.5 F | DIASTOLIC BLOOD PRESSURE: 74 MMHG | HEART RATE: 73 BPM | RESPIRATION RATE: 18 BRPM | SYSTOLIC BLOOD PRESSURE: 150 MMHG | OXYGEN SATURATION: 98 %

## 2022-12-07 PROCEDURE — G0157 HHC PT ASSISTANT EA 15: HCPCS

## 2022-12-07 NOTE — HOME HEALTH
Covid 19 pre-screen performed.Pt states no falls or changes to insurance or medicaiton since last visit. Next visit to address gt mechanics w/STC, ROM techniques, and resistive band ex.

## 2022-12-12 ENCOUNTER — HOME CARE VISIT (OUTPATIENT)
Dept: HOME HEALTH SERVICES | Facility: CLINIC | Age: 62
End: 2022-12-12
Payer: COMMERCIAL

## 2022-12-12 VITALS
SYSTOLIC BLOOD PRESSURE: 118 MMHG | HEART RATE: 75 BPM | DIASTOLIC BLOOD PRESSURE: 66 MMHG | TEMPERATURE: 98.7 F | OXYGEN SATURATION: 97 % | RESPIRATION RATE: 18 BRPM

## 2022-12-12 PROCEDURE — G0151 HHCP-SERV OF PT,EA 15 MIN: HCPCS

## 2022-12-13 ENCOUNTER — HOME CARE VISIT (OUTPATIENT)
Dept: HOME HEALTH SERVICES | Facility: CLINIC | Age: 62
End: 2022-12-13
Payer: COMMERCIAL

## 2022-12-13 VITALS
DIASTOLIC BLOOD PRESSURE: 72 MMHG | OXYGEN SATURATION: 98 % | SYSTOLIC BLOOD PRESSURE: 120 MMHG | RESPIRATION RATE: 18 BRPM | TEMPERATURE: 97.6 F | HEART RATE: 78 BPM

## 2022-12-13 PROCEDURE — G0300 HHS/HOSPICE OF LPN EA 15 MIN: HCPCS

## 2022-12-14 NOTE — HOME HEALTH
FOCUS OF CARE/SKILLED NEED: wound assessment, fall prevention  TEACHING/INTERVENTIONS: wound assessment, fall prevention,  PROGRESS TOWARD GOALS: ongoing  PHYSICIAN CONTACT:  no  INSURANCE CHANGES?  no  FALLS SINCE LAST VISIT?  no  MEDICATION CHANGES? no  PLAN FOR NEXT VISIT: wound assessment, fall prevention  PRE-SCREENED FOR COVID? no s/s of COVID voiced or noted

## 2022-12-15 ENCOUNTER — HOME CARE VISIT (OUTPATIENT)
Dept: HOME HEALTH SERVICES | Facility: CLINIC | Age: 62
End: 2022-12-15
Payer: COMMERCIAL

## 2022-12-15 PROCEDURE — G0151 HHCP-SERV OF PT,EA 15 MIN: HCPCS

## 2022-12-16 VITALS
HEART RATE: 81 BPM | TEMPERATURE: 97.6 F | OXYGEN SATURATION: 98 % | RESPIRATION RATE: 18 BRPM | DIASTOLIC BLOOD PRESSURE: 78 MMHG | SYSTOLIC BLOOD PRESSURE: 126 MMHG

## 2022-12-20 ENCOUNTER — HOME CARE VISIT (OUTPATIENT)
Dept: HOME HEALTH SERVICES | Facility: CLINIC | Age: 62
End: 2022-12-20
Payer: COMMERCIAL

## 2022-12-20 VITALS
DIASTOLIC BLOOD PRESSURE: 80 MMHG | TEMPERATURE: 97.3 F | OXYGEN SATURATION: 97 % | HEART RATE: 84 BPM | SYSTOLIC BLOOD PRESSURE: 132 MMHG | RESPIRATION RATE: 18 BRPM

## 2022-12-20 VITALS
RESPIRATION RATE: 18 BRPM | TEMPERATURE: 98.2 F | DIASTOLIC BLOOD PRESSURE: 70 MMHG | SYSTOLIC BLOOD PRESSURE: 118 MMHG | OXYGEN SATURATION: 98 % | HEART RATE: 68 BPM

## 2022-12-20 PROCEDURE — G0300 HHS/HOSPICE OF LPN EA 15 MIN: HCPCS

## 2022-12-20 PROCEDURE — G0157 HHC PT ASSISTANT EA 15: HCPCS

## 2022-12-20 NOTE — HOME HEALTH
Covid 19 pre-screen performed.Pt states no falls or changes to insurance or medicaiton since last visit. Next visit to address balance, ROM, and gt training w/STC.

## 2022-12-21 NOTE — HOME HEALTH
FOCUS OF CARE/SKILLED NEED: wound assessment, fall prevention,  TEACHING/INTERVENTIONS: wound assessment, fall prevention,  PROGRESS TOWARD GOALS: ongoing  PHYSICIAN CONTACT:  no  INSURANCE CHANGES?  no  FALLS SINCE LAST VISIT?  no  MEDICATION CHANGES? no  PLAN FOR NEXT VISIT: wound assessment, fall prevention,  PRE-SCREENED FOR COVID? no s/s of COVID voiced or noted

## 2022-12-22 ENCOUNTER — HOME CARE VISIT (OUTPATIENT)
Dept: HOME HEALTH SERVICES | Facility: CLINIC | Age: 62
End: 2022-12-22
Payer: COMMERCIAL

## 2022-12-22 VITALS
HEART RATE: 78 BPM | RESPIRATION RATE: 18 BRPM | OXYGEN SATURATION: 98 % | DIASTOLIC BLOOD PRESSURE: 80 MMHG | SYSTOLIC BLOOD PRESSURE: 140 MMHG

## 2022-12-22 PROCEDURE — G0151 HHCP-SERV OF PT,EA 15 MIN: HCPCS

## 2022-12-22 NOTE — HOME HEALTH
Patient reports feeling well today. DEnies  signs and symptoms of COVID,  falls, denies medication changes. Reports  compliance with HEP. Reports occasional L knee pain with weight bearing activites. Instructed patient in standing exercises, standing dynamic balance. Worked on gait training. Introduced stationary bike with CGA for transfer safety. Unable to complete the full revolution due to a decreased L knee flexion. However improved with trials. Intorduced a SPC with amulation with CGA and VC for safety, gait mechanics.  Planning to continue to work on safety with transfers, gait, strengthening and ROM.

## 2022-12-26 ENCOUNTER — HOME CARE VISIT (OUTPATIENT)
Dept: HOME HEALTH SERVICES | Facility: HOME HEALTHCARE | Age: 62
End: 2022-12-26
Payer: COMMERCIAL

## 2022-12-28 ENCOUNTER — HOME CARE VISIT (OUTPATIENT)
Dept: HOME HEALTH SERVICES | Facility: CLINIC | Age: 62
End: 2022-12-28
Payer: COMMERCIAL

## 2022-12-29 ENCOUNTER — HOME CARE VISIT (OUTPATIENT)
Dept: HOME HEALTH SERVICES | Facility: CLINIC | Age: 62
End: 2022-12-29
Payer: COMMERCIAL

## 2022-12-29 VITALS
RESPIRATION RATE: 16 BRPM | SYSTOLIC BLOOD PRESSURE: 124 MMHG | HEART RATE: 65 BPM | OXYGEN SATURATION: 97 % | DIASTOLIC BLOOD PRESSURE: 60 MMHG

## 2022-12-29 PROCEDURE — G0151 HHCP-SERV OF PT,EA 15 MIN: HCPCS

## 2022-12-29 NOTE — HOME HEALTH
Patient reports feeling well today. DEnies  signs and symptoms of COVID,  falls, denies medication changes. Reports  compliance with HEP . L knee pain with ROM exs. Patient reports she had a fall earlier today. She was getting dressed in the bathroom and stepped onto the LLE while unsupported. Her knee bent and she slid down onto the floor hitting her head on a wall. Family assisted in getting her up. NO apparent injury to her head or the L knee. Progressed HEp to LAQ with terminal 5 sec holds, knee flex in supine and sitting - AAROM to max tolerance with 10 sec holds, standing L calf stretches. Gait training with RWx and cues for improved L quads control and improved gait mechanics. Patient was told to transition to the outpatient therapy next week. Planning to progress HEP then d/c next visit.

## 2022-12-30 ENCOUNTER — HOME CARE VISIT (OUTPATIENT)
Dept: HOME HEALTH SERVICES | Facility: CLINIC | Age: 62
End: 2022-12-30
Payer: COMMERCIAL

## 2023-01-02 ENCOUNTER — HOME CARE VISIT (OUTPATIENT)
Dept: HOME HEALTH SERVICES | Facility: CLINIC | Age: 63
End: 2023-01-02
Payer: COMMERCIAL

## 2023-01-02 VITALS
HEART RATE: 80 BPM | DIASTOLIC BLOOD PRESSURE: 82 MMHG | SYSTOLIC BLOOD PRESSURE: 130 MMHG | RESPIRATION RATE: 16 BRPM | OXYGEN SATURATION: 97 %

## 2023-01-02 PROCEDURE — G0151 HHCP-SERV OF PT,EA 15 MIN: HCPCS

## 2023-01-02 NOTE — HOME HEALTH
During this episode of care pt received education and trainning on safe transfer and ambulaiton technique with use of RWx and a cane. Patient progressed to independent transfers and ambulation with use of a walker. Requires CGA  for ambulation with a cane. Requires supervision with stair negotiation with a rail use. Demonstrated increased L knee AAROM.  HEP was developed and pt demonstrated correct independent completion and daily compliance. Education was provided on medication management, home safety - pt and caregiver verbalized understanding. Discharged from skilled physical therapy and home health care today due to meeting goals and transitioning to the outpatient setting.

## 2023-02-15 ENCOUNTER — OFFICE VISIT (OUTPATIENT)
Dept: CARDIOLOGY | Facility: CLINIC | Age: 63
End: 2023-02-15
Payer: COMMERCIAL

## 2023-02-15 VITALS
OXYGEN SATURATION: 98 % | HEART RATE: 70 BPM | SYSTOLIC BLOOD PRESSURE: 122 MMHG | WEIGHT: 230 LBS | BODY MASS INDEX: 40.75 KG/M2 | HEIGHT: 63 IN | DIASTOLIC BLOOD PRESSURE: 80 MMHG

## 2023-02-15 DIAGNOSIS — I10 ESSENTIAL HYPERTENSION: ICD-10-CM

## 2023-02-15 DIAGNOSIS — E78.5 DYSLIPIDEMIA: ICD-10-CM

## 2023-02-15 DIAGNOSIS — E66.01 MORBID OBESITY WITH BMI OF 40.0-44.9, ADULT: ICD-10-CM

## 2023-02-15 DIAGNOSIS — I47.1 PAROXYSMAL SVT (SUPRAVENTRICULAR TACHYCARDIA): Primary | ICD-10-CM

## 2023-02-15 PROCEDURE — 99214 OFFICE O/P EST MOD 30 MIN: CPT | Performed by: INTERNAL MEDICINE

## 2023-02-15 RX ORDER — ASPIRIN 81 MG/1
81 TABLET, CHEWABLE ORAL DAILY
COMMUNITY

## 2023-02-15 RX ORDER — SENNOSIDES 8.6 MG
650 CAPSULE ORAL EVERY 8 HOURS PRN
COMMUNITY

## 2023-02-15 NOTE — PROGRESS NOTES
Reason for Visit: cardiovascular follow up.    HPI:  Shannon Coombs is a 62 y.o. female is here today for 2-month follow-up.  Since last visit she had a knee replacement in November.  She is doing physical therapy and starting to get more mobile.  She is walking with a cane.   Previously she was using a walker and this started bothering her back.  She denies any chest pain, palpitations, dizziness, syncope, PND, or orthopnea.  She has had some swelling in her legs, especially on the left.  She has difficulty getting the compression stockings on.  She has gained about 10 lbs since surgery.      Previous Cardiac Testing and Procedures:  - Echo (14) EF 65%, grade 2 diastolic dysfunction, mild MR and TR, RVSP 48 mmHg  - Holter monitor (2017) 21,180 one atrial ectopic beats, runs of nonsustained SVT up to 5 beats at 167 bpm  - Echo (2017) EF 65%, normal diastolic function, normal valves  - Lower extremity venous ultrasound (2022) normal duplex of the left lower extremity with no evidence of DVT    Lab data:  - Lipid panel (2018) total cholesterol 141, HDL 45, LDL 79, triglycerides 86  - Lipid panel (2/15/2019) total cholesterol 165, HDL 60, LDL 85, triglycerides 102  - Lipid panel (2020) total cholesterol 168, HDL 64, LDL 91, triglycerides 65  - Lipid panel (3/10/2021) total cholesterol 199, HDL 63, , triglycerides 78  - Lipid panel (2022) total cholesterol 158, HDL 49, LDL 88, triglycerides 105    Patient Active Problem List   Diagnosis   • Dizziness   • Fatigue   • Syncope and collapse   • Paroxysmal SVT (supraventricular tachycardia) (HCC)   • Morbid obesity with BMI of 40.0-44.9, adult (HCC)   • Dyslipidemia   • Essential hypertension       Social History     Tobacco Use   • Smoking status: Former     Packs/day: 0.50     Years: 35.00     Pack years: 17.50     Types: Cigarettes     Quit date: 2019     Years since quittin.0   • Smokeless tobacco: Never    Vaping Use   • Vaping Use: Never used   Substance Use Topics   • Alcohol use: Yes     Comment: rarely   • Drug use: No       Family History   Problem Relation Age of Onset   • Dementia Mother    • Hyperlipidemia Mother    • Heart disease Father    • Hyperlipidemia Father        The following portions of the patient's history were reviewed and updated as appropriate: allergies, current medications, past family history, past medical history, past social history, past surgical history and problem list.      Current Outpatient Medications:   •  acetaminophen (TYLENOL) 650 MG 8 hr tablet, Take 650 mg by mouth Every 8 (Eight) Hours As Needed for Mild Pain., Disp: , Rfl:   •  aspirin 81 MG chewable tablet, Chew 81 mg Daily. TAKING 2 PILLS QD, Disp: , Rfl:   •  atorvastatin (LIPITOR) 40 MG tablet, Take 40 mg by mouth Daily. Indications: High Amount of Fats in the Blood, Disp: , Rfl:   •  Calcium Carbonate-Vitamin D (CALTRATE 600+D PO), , Disp: , Rfl:   •  cyclobenzaprine (FLEXERIL) 10 MG tablet, Take 10 mg by mouth Daily As Needed for Muscle Spasms (cramping). Indications: Muscle Spasm, cramping, Disp: , Rfl:   •  HYDROcodone-acetaminophen (NORCO) 7.5-325 MG per tablet, Take 1 tablet by mouth Every 6 (Six) Hours As Needed for Mild Pain, Moderate Pain or Severe Pain. Indications: Pain, Disp: , Rfl:   •  metoprolol succinate XL (TOPROL-XL) 25 MG 24 hr tablet, Take 25 mg by mouth Daily. Indications: High Blood Pressure Disorder, Disp: , Rfl:   •  nicotine polacrilex (NICORETTE) 4 MG gum, Chew 4 mg As Needed for Smoking Cessation. Indications: Nicotine Addiction, Disp: , Rfl:   •  polyethylene glycol (MIRALAX) 17 g packet, Take 17 g by mouth Daily. Indications: Constipation, Disp: , Rfl:   •  Probiotic Product (PROBIOTIC-10 PO), Take 1 capsule by mouth 2 (Two) Times a Day. Indications: maintenance, Disp: , Rfl:   •  spironolactone (ALDACTONE) 25 MG tablet, Take 25 mg by mouth Daily. Indications: Edema, Disp: , Rfl:   •   "vitamin B-12 (CYANOCOBALAMIN) 1000 MCG tablet, Take 1,000 mcg by mouth Daily. Indications: Inadequate Vitamin B12, Disp: , Rfl:     Review of Systems   Constitutional: Negative for chills and fever.   Cardiovascular: Positive for leg swelling. Negative for chest pain and paroxysmal nocturnal dyspnea.   Respiratory: Negative for cough and shortness of breath.    Skin: Negative for rash.   Musculoskeletal: Positive for back pain and muscle weakness.   Gastrointestinal: Negative for abdominal pain and heartburn.   Neurological: Positive for disturbances in coordination. Negative for dizziness and numbness.       Objective   /80 (BP Location: Left arm, Patient Position: Sitting, Cuff Size: Adult)   Pulse 70   Ht 160 cm (62.99\")   Wt 104 kg (230 lb)   SpO2 98%   BMI 40.75 kg/m²   Constitutional:       Appearance: Well-developed. Obese.   HENT:      Head: Normocephalic and atraumatic.   Pulmonary:      Effort: Pulmonary effort is normal.      Breath sounds: Normal breath sounds.   Cardiovascular:      Normal rate. Regular rhythm.      Murmurs: There is no murmur.      No gallop. No click.   Skin:     General: Skin is warm and dry.   Neurological:      Mental Status: Alert and oriented to person, place, and time.       Procedures      ICD-10-CM ICD-9-CM   1. Paroxysmal SVT (supraventricular tachycardia) ([facility])  I47.1 427.0   2. Essential hypertension  I10 401.9   3. Dyslipidemia  E78.5 272.4   4. Morbid obesity with BMI of 40.0-44.9, adult ([facility])  E66.01 278.01    Z68.41 V85.41         Assessment/Plan:  1.  Paroxysmal SVT: No current symptoms.  Continue metoprolol.      2.  Essential hypertension: Blood pressure is well controlled today.  Good control at home as well.  Continue metoprolol and spironolactone.      3.  Dyslipidemia: Good control on lipid panel from 4/8/2022.  Continue atorvastatin.      4.  Morbid obesity: Class 3 Severe Obesity (BMI >=40). Obesity-related health conditions include the following: " hypertension and dyslipidemias. Obesity is improving with lifestyle modifications. BMI is is above average; BMI management plan is completed. We discussed portion control and increasing exercise.

## 2023-05-22 ENCOUNTER — OFFICE VISIT (OUTPATIENT)
Dept: NEUROSURGERY | Facility: CLINIC | Age: 63
End: 2023-05-22
Payer: COMMERCIAL

## 2023-05-22 ENCOUNTER — HOSPITAL ENCOUNTER (OUTPATIENT)
Dept: GENERAL RADIOLOGY | Facility: HOSPITAL | Age: 63
Discharge: HOME | End: 2023-05-22
Admitting: NURSE PRACTITIONER
Payer: COMMERCIAL

## 2023-05-22 ENCOUNTER — PATIENT ROUNDING (BHMG ONLY) (OUTPATIENT)
Dept: NEUROSURGERY | Facility: CLINIC | Age: 63
End: 2023-05-22
Payer: COMMERCIAL

## 2023-05-22 VITALS — HEIGHT: 63 IN | WEIGHT: 229.4 LBS | BODY MASS INDEX: 40.64 KG/M2

## 2023-05-22 DIAGNOSIS — M54.6 THORACOLUMBAR BACK PAIN: ICD-10-CM

## 2023-05-22 DIAGNOSIS — R26.81 UNSTEADY GAIT: ICD-10-CM

## 2023-05-22 DIAGNOSIS — E66.01 CLASS 3 SEVERE OBESITY DUE TO EXCESS CALORIES WITH SERIOUS COMORBIDITY AND BODY MASS INDEX (BMI) OF 40.0 TO 44.9 IN ADULT: ICD-10-CM

## 2023-05-22 DIAGNOSIS — R29.2 BABINSKI REFLEX: ICD-10-CM

## 2023-05-22 DIAGNOSIS — R29.6 RECURRENT FALLS: ICD-10-CM

## 2023-05-22 DIAGNOSIS — S22.080A CLOSED WEDGE COMPRESSION FRACTURE OF T11 VERTEBRA, INITIAL ENCOUNTER: Primary | ICD-10-CM

## 2023-05-22 DIAGNOSIS — M54.50 THORACOLUMBAR BACK PAIN: ICD-10-CM

## 2023-05-22 DIAGNOSIS — R29.2 HYPERREFLEXIA: ICD-10-CM

## 2023-05-22 DIAGNOSIS — E03.9 HOFFMANN'S SYNDROME: ICD-10-CM

## 2023-05-22 DIAGNOSIS — G73.7 HOFFMANN'S SYNDROME: ICD-10-CM

## 2023-05-22 DIAGNOSIS — R29.898 UPPER EXTREMITY WEAKNESS: ICD-10-CM

## 2023-05-22 PROCEDURE — 72052 X-RAY EXAM NECK SPINE 6/>VWS: CPT

## 2023-05-22 RX ORDER — FAMOTIDINE 40 MG/1
TABLET, FILM COATED ORAL
COMMUNITY
Start: 2023-05-12

## 2023-05-22 RX ORDER — MELOXICAM 7.5 MG/1
TABLET ORAL
COMMUNITY

## 2023-05-22 NOTE — PROGRESS NOTES
Primary Care Provider: Mino Hager MD  Requesting Provider: No ref. provider found    Chief Complaint:   Chief Complaint   Patient presents with    Back Pain     PT is here for followup on compression fracture T11.     History of Present Illness  Consultation at the request of No ref. provider found.    HPI:  Shannon Coombs is a 63 y.o. female who presents today with a complaint of thoracolumbar back pain.  History of chronic thoracic and lumbar back pain over the past 10+ years that worsened on 5/1/2023 after she fell backwards in her bathroom landing on her shower chair.  She denies hitting her head or loss of consciousness and did not require assistance to stand.  She reports an additional fall on 5/20/2023 without significant injury or worsening pain.    Ms. Ethan Coombs states she was evaluated by Dr. Kemal Cordon in 2011-12, however he did not recommend neck or thoracic back surgery.    Currently, she complains of constant thoracolumbar back pain that waxes and wanes in severity.  She denies radicular pain or dysesthesias in the thoracic or lumbar distribution.  Her back pain worsens with prolonged standing, walking, forward flexion.  Alleviating factors include sitting, walking, and use of Tylenol arthritis.   She presents today with a rolling walker and intermittently uses a cane while ambulating at home.  History of recurrent falls.  Most recent falls discussed above.    Subsequent history of neck pain and upper extremity numbness and tingling.  She currently denies neck or radicular pain or dysesthesias.  Ms. Ethan Coombs additionally denies fevers, chills, night sweats, unexplained weight loss, saddle anesthesia, or bowel dysfunction.  History of chronic urgency to void.  She currently rates the severity of her symptoms 7/10.  No additional concerns at this time.      Ms. Ethan Coombs recently completed a dedicated course of physician directed physical therapy.  She  does not participate in massage care, chiropractic care, nor been evaluated by pain management.    Oswestry Disability Index = 52%   Score   Pain Intensity Moderate pain-2   Personal Care Look after myslef but very painful-1   Lifting Medium weights off a table-3   Walking Pain prevents > 100 yards-3   Sitting Pain prevents sitting > 1 hr-2   Standing Pain limits standing to < 1/2 hr-3   Sleeping Can only sleep < 6 hrs-2   Sex Life (if applicable) Pain prevents any sex-6   Social Life I do not go out as often because of pain-3   Traveling Pain is bad but trips over 2 hrs-2   (Lucille et al, 1980)    SCORE INTERPRETATION OF THE OSWESTRY LBP DISABILITY QUESTIONNAIRE     40-60% Severe disability Pain remains the main problem in this group of patients, but travel, personal care, social life, sexual activity, and sleep are also affected.  These patients require detailed investigation.     ROS  Review of Systems   Constitutional: Negative.    HENT: Negative.     Eyes: Negative.    Respiratory: Negative.     Cardiovascular: Negative.    Gastrointestinal: Negative.    Endocrine: Negative.    Genitourinary: Negative.    Musculoskeletal:  Positive for back pain and gait problem.   Skin: Negative.    Allergic/Immunologic: Negative.    Hematological: Negative.    Psychiatric/Behavioral: Negative.     All other systems reviewed and are negative.    Past Medical History:   Diagnosis Date    Allergic rhinitis     Atrial premature depolarization     Cardiac arrhythmia     Dental abscess     Deviated nasal septum     Dizziness and giddiness     Hearing loss     Hyperlipidemia     Hypertension     Hypotension     Rotator cuff tear     Stomach ulcer     Syncope and collapse     Visual disturbance      Past Surgical History:   Procedure Laterality Date    COLONOSCOPY  08/13/2018    ENDOSCOPY  08/13/2018    ENDOSCOPY  10/2018    GANGLION CYST EXCISION      foot    REPLACEMENT TOTAL KNEE Left 11/2022    DR MCCORD     Family History:  family history includes Dementia in her mother; Heart disease in her father; Hyperlipidemia in her father and mother.    Social History:  reports that she quit smoking about 4 years ago. Her smoking use included cigarettes. She has a 17.50 pack-year smoking history. She has never used smokeless tobacco. She reports current alcohol use. She reports that she does not use drugs.    Medications:    Current Outpatient Medications:     acetaminophen (TYLENOL) 650 MG 8 hr tablet, Take 1 tablet by mouth Every 8 (Eight) Hours As Needed for Mild Pain., Disp: , Rfl:     aspirin 81 MG chewable tablet, Chew 1 tablet Daily. TAKING 2 PILLS QD, Disp: , Rfl:     atorvastatin (LIPITOR) 40 MG tablet, Take 1 tablet by mouth Daily. Indications: High Amount of Fats in the Blood, Disp: , Rfl:     Calcium Carbonate-Vitamin D (CALTRATE 600+D PO), , Disp: , Rfl:     Cyanocobalamin ER 1000 MCG tablet controlled-release, Daily., Disp: , Rfl:     famotidine (PEPCID) 40 MG tablet, , Disp: , Rfl:     HYDROcodone-acetaminophen (NORCO) 7.5-325 MG per tablet, Take 1 tablet by mouth Every 6 (Six) Hours As Needed for Mild Pain, Moderate Pain or Severe Pain. Indications: Pain, Disp: , Rfl:     meloxicam (MOBIC) 7.5 MG tablet, meloxicam 7.5 mg tablet  Mobic 7.5 mg tablet 1 Tablet(s) Oral Take once a day; D/C Reason: Discontinued, Disp: , Rfl:     metoprolol succinate XL (TOPROL-XL) 25 MG 24 hr tablet, Take 1 tablet by mouth Daily. Indications: High Blood Pressure Disorder, Disp: , Rfl:     nicotine polacrilex (NICORETTE) 4 MG gum, Chew 1 each As Needed for Smoking Cessation. Indications: Nicotine Addiction, Disp: , Rfl:     polyethylene glycol (MIRALAX) 17 g packet, Take 17 g by mouth Daily. Indications: Constipation, Disp: , Rfl:     Probiotic Product (PROBIOTIC-10 PO), Take 1 capsule by mouth 2 (Two) Times a Day. Indications: maintenance, Disp: , Rfl:     spironolactone (ALDACTONE) 25 MG tablet, Take 1 tablet by mouth Daily. Indications: Edema, Disp:  ", Rfl:     vitamin B-12 (CYANOCOBALAMIN) 1000 MCG tablet, Take 1 tablet by mouth Daily. Indications: Inadequate Vitamin B12, Disp: , Rfl:     Allergies:  Aleve [naproxen] and Naproxen sodium    Objective   Ht 160 cm (62.99\")   Wt 104 kg (229 lb 6.4 oz)   BMI 40.65 kg/m²   Physical Exam  Vitals and nursing note reviewed.   Constitutional:       General: She is not in acute distress.     Appearance: Normal appearance. She is well-developed and well-groomed. She is morbidly obese. She is not ill-appearing, toxic-appearing or diaphoretic.      Comments: BMI 40.65   HENT:      Head: Normocephalic and atraumatic.      Right Ear: Hearing normal.      Left Ear: Hearing normal.   Eyes:      Extraocular Movements: EOM normal.      Conjunctiva/sclera: Conjunctivae normal.      Pupils: Pupils are equal, round, and reactive to light.   Neck:      Trachea: Trachea normal.   Cardiovascular:      Rate and Rhythm: Normal rate and regular rhythm.   Pulmonary:      Effort: Pulmonary effort is normal. No tachypnea, bradypnea, accessory muscle usage or respiratory distress.   Abdominal:      Palpations: Abdomen is soft.   Musculoskeletal:      Cervical back: Full passive range of motion without pain and neck supple.   Skin:     General: Skin is warm and dry.   Neurological:      Mental Status: She is alert and oriented to person, place, and time.      GCS: GCS eye subscore is 4. GCS verbal subscore is 5. GCS motor subscore is 6.      Deep Tendon Reflexes:      Reflex Scores:       Tricep reflexes are 4+ on the right side and 4+ on the left side.       Bicep reflexes are 4+ on the right side and 4+ on the left side.       Brachioradialis reflexes are 4+ on the right side and 4+ on the left side.       Patellar reflexes are 3+ on the right side and 3+ on the left side.       Achilles reflexes are 0 on the right side and 0 on the left side.  Psychiatric:         Speech: Speech normal.         Behavior: Behavior normal. Behavior is " cooperative.     Neurologic Exam     Mental Status   Oriented to person, place, and time.   Attention: normal. Concentration: normal.   Speech: speech is normal   Level of consciousness: alert    Cranial Nerves     CN II   Visual fields full to confrontation.     CN III, IV, VI   Pupils are equal, round, and reactive to light.  Extraocular motions are normal.     CN V   Facial sensation intact.     CN VII   Facial expression full, symmetric.     CN VIII   CN VIII normal.     CN IX, X   CN IX normal.     CN XI   CN XI normal.     Motor Exam   Right arm tone: normal  Left arm tone: normal  Right leg tone: normal  Left leg tone: normal    Strength   Right deltoid: 4/5  Left deltoid: 4/5  Right biceps: 5/5  Left biceps: 5/5  Right triceps: 5/5  Left triceps: 5/5  Right wrist extension: 5/5  Left wrist extension: 5/5  Right iliopsoas: 5/5  Left iliopsoas: 5/5  Right quadriceps: 5/5  Left quadriceps: 5/5  Right anterior tibial: 5/5  Left anterior tibial: 5/5  Right gastroc: 5/5  Left gastroc: 5/5  Right EHL 5/5  Left EHL 5/5       Sensory Exam   Right arm light touch: normal  Left arm light touch: normal  Right leg light touch: normal  Left leg light touch: normal    Gait, Coordination, and Reflexes     Gait  Gait: wide-based    Tremor   Resting tremor: absent  Intention tremor: absent  Action tremor: absent    Reflexes   Right brachioradialis: 4+  Left brachioradialis: 4+  Right biceps: 4+  Left biceps: 4+  Right triceps: 4+  Left triceps: 4+  Right patellar: 3+  Left patellar: 3+  Right achilles: 0  Left achilles: 0  Right plantar: upgoing  Left plantar: normal  Right Jones: present  Left Jones: present  Right ankle clonus: absent  Left ankle clonus: absent  Right pendular knee jerk: absent  Left pendular knee jerk: absent    Female  strength (pounds)  AGE Right Hand RH Norms Left Hand LH Norms   20-24  70+14.5  61+13.1   25-29  75+13.9  63.5+12   30-34  79+19.2  68+17.7   35-39  74+10.8  66+11.7   40-44   70+13.5  62+13.8   45-49  62+15.1  56+12.7   50-54  66+11.6  57+10.7   55-59  57+12.5  47+11.9   60-64 *60 55+10.1 65 46+10.1   65-69  50+9.7  41+8.2   70-74  50+11.7  42+10.2   75+  43+11.0  38+8.9   (PAOLO Hawthorne et al; Hand Dynometer: Effects of trials and sessions.  Perpetual and Motor Skills 61:195-8, 1985)  * = Dominant hand  > = Intervention    Imaging: (independent review and interpretation)  2/3/2023.  X-rays of the lumbar spine show no evidence of acute fractures, multilevel degenerative changes, anteriolisthesis of L3 over L4      5/4/2023.  X-rays of the lumbar spine show age-indeterminate compression fracture of T11, unchanged anterolisthesis of L3 over L4, and multilevel degenerative changes.       ASSESSMENT and PLAN  Shannon Coombs is a 63 y.o. female with a significant medical history of cardiac arrhythmia, hypertension, hyperlipidemia, former smoker, and morbid obesity.  She presents with a new problem of thoracolumbar back pain. UNIQUE: 52.  Physical exam findings of +4/5 bilateral deltoid weakness, +4 upper extremity reflexes, +3 bilateral patellar reflexes, absent bilateral Achilles, bilateral Devin's and right Babinski, and a wide-based shuffling gait.  Her imaging shows age-indeterminate compression fracture of T11, unchanged anterolisthesis of L3 over L4, and multilevel degenerative changes.    RECOMMENDATIONS ...  Age-indeterminate T11 compression fracture  Thoracolumbar back pain, secondary to above  In review of current imaging, Maggie T11 fractures appear stable.  For further evaluation and to assess fracture acuity, we will send her for a noncontrasted MRI of the lumbar spine extended to include T10.  Considering her history of chronic thoracic back pain, we will postpone placing her in a TLSO brace until fracture acuity can be determined.  In the interim, I recommended she avoid lifting greater than 8 pounds, bending, or twisting; allowing her pain to guide her physical  mobility.  She may continue Tylenol arthritis as needed per package instructions for pain.      Hyperreflexia/myelopathy  DDX:  Congenital: Chiari malformation, tethered cord, syringomyelia, hereditary spastic paraplegia  Acquired: Cervical or thoracic spinal stenosis, traumatic, herniated disc, kyphosis, extra medullary hematopoiesis, epidural lipomatosis, OPLL, Paget's disease  Neoplastic: Spinal cord tumors intra or extra medullary, carcinomatosis, paraneoplastic syndromes  Vascular: Epidural hematoma, spinal cord infarction, vascular malformation such as AVM  Iatrogenic: Radiation myelopathy  Infectious: Post viral or autoimmune.  Often seen in HIV, syphilis, cytomegalovirus, herpes simplex 2 and CJD  Demyelinating: Acute transverse myelitis, multiple sclerosis, Devic's syndrome  Metabolic: Subacute combined systemic disease due to vitamin B-12 deficiency  Motor neuron disease: Amyotrophic lateral sclerosis, primary lateral sclerosis    Shnanon presents today with a complaint of thoracolumbar back pain and concern for an acute T11 compression fracture.  Throughout the interview she endorses a history of neck pain and upper extremity numbness and tingling which has since resolved, as well as a declining gait and balance resulting in recurrent falls.  On physical exam she is found to have bilateral deltoid weakness and gross hyperreflexia to include bilateral Devin's and a right Babinski.  Given her complaint and physical exam findings, I find it prudent to proceed today x-rays of the cervical spine with flexion-extension to assess for areas of instability, as well as a noncontrasted MRI of the cervical spine to rule out cervical stenosis and need for surgical intervention.  I advised the patient to call to return sooner for new or worsening complaints of weakness, paresthesias, gait disturbances, or any additional concerns.  Treatment options discussed in detail with Shannon and she voiced understanding.  Ms.  Ethan Coombs agrees with this plan of care.    At high risk for falls  Considering Ms. Ethan Coombs 's recent fall, I highly recommended she continue to use her walker while ambulating and take strict precautions to avoid falling, as caution is her greatest means of avoiding serious injury.  Fall prevention information provided in AVS.  TIANNA Fall Risk Assessment has not been completed.    Class 3 obesity (BMI >= 40) due to excessive calories with serious comorbidities BMI of 40.0-44.9 in adult  Body mass index is 40.65 kg/m². Information on the DASH diet provided in the AVS.  We will continue to provided diet and exercise information with the goal of weight loss at each scheduled appointment.     Diagnoses and all orders for this visit:    1. Closed wedge compression fracture of T11 vertebra, initial encounter (Primary)  -     MRI Lumbar Spine Without Contrast; Future    2. Thoracolumbar back pain  -     MRI Lumbar Spine Without Contrast; Future    3. Upper extremity weakness  -     MRI Cervical Spine Without Contrast; Future  -     XR Spine Cervical Complete With Flex Ext; Future    4. Devin's syndrome  -     MRI Cervical Spine Without Contrast; Future    5. Hyperreflexia  -     MRI Cervical Spine Without Contrast; Future  -     XR Spine Cervical Complete With Flex Ext; Future    6. Babinski reflex  -     MRI Cervical Spine Without Contrast; Future    7. Unsteady gait  -     MRI Cervical Spine Without Contrast; Future    8. Recurrent falls    9. Class 3 severe obesity due to excess calories with serious comorbidity and body mass index (BMI) of 40.0 to 44.9 in adult      Return for Followup with Stephen after imaging on [place] day.   .    Thank you for this Consultation and the opportunity to participate in Shannon's care.    Sincerely,  ANTIONE Graham    I spent 53 minutes caring for Shannon on this date of service. This time includes time spent by me in the following activities: preparing for  the visit, reviewing tests, obtaining and/or reviewing a separately obtained history, performing a medically appropriate examination and/or evaluation, counseling and educating the patient/family/caregiver, ordering medications, tests, or procedures, and documenting information in the medical record.

## 2023-05-22 NOTE — PATIENT INSTRUCTIONS
"DASH Eating Plan  DASH stands for Dietary Approaches to Stop Hypertension. The DASH eating plan is a healthy eating plan that has been shown to:  Reduce high blood pressure (hypertension).  Reduce your risk for type 2 diabetes, heart disease, and stroke.  Help with weight loss.  What are tips for following this plan?  Reading food labels  Check food labels for the amount of salt (sodium) per serving. Choose foods with less than 5 percent of the Daily Value of sodium. Generally, foods with less than 300 milligrams (mg) of sodium per serving fit into this eating plan.  To find whole grains, look for the word \"whole\" as the first word in the ingredient list.  Shopping  Buy products labeled as \"low-sodium\" or \"no salt added.\"  Buy fresh foods. Avoid canned foods and pre-made or frozen meals.  Cooking  Avoid adding salt when cooking. Use salt-free seasonings or herbs instead of table salt or sea salt. Check with your health care provider or pharmacist before using salt substitutes.  Do not curry foods. Cook foods using healthy methods such as baking, boiling, grilling, roasting, and broiling instead.  Cook with heart-healthy oils, such as olive, canola, avocado, soybean, or sunflower oil.  Meal planning    Eat a balanced diet that includes:  4 or more servings of fruits and 4 or more servings of vegetables each day. Try to fill one-half of your plate with fruits and vegetables.  6-8 servings of whole grains each day.  Less than 6 oz (170 g) of lean meat, poultry, or fish each day. A 3-oz (85-g) serving of meat is about the same size as a deck of cards. One egg equals 1 oz (28 g).  2-3 servings of low-fat dairy each day. One serving is 1 cup (237 mL).  1 serving of nuts, seeds, or beans 5 times each week.  2-3 servings of heart-healthy fats. Healthy fats called omega-3 fatty acids are found in foods such as walnuts, flaxseeds, fortified milks, and eggs. These fats are also found in cold-water fish, such as sardines, salmon, " and mackerel.  Limit how much you eat of:  Canned or prepackaged foods.  Food that is high in trans fat, such as some fried foods.  Food that is high in saturated fat, such as fatty meat.  Desserts and other sweets, sugary drinks, and other foods with added sugar.  Full-fat dairy products.  Do not salt foods before eating.  Do not eat more than 4 egg yolks a week.  Try to eat at least 2 vegetarian meals a week.  Eat more home-cooked food and less restaurant, buffet, and fast food.  Lifestyle  When eating at a restaurant, ask that your food be prepared with less salt or no salt, if possible.  If you drink alcohol:  Limit how much you use to:  0-1 drink a day for women who are not pregnant.  0-2 drinks a day for men.  Be aware of how much alcohol is in your drink. In the [place] one drink equals one 12 oz bottle of beer (355 mL), one 5 oz glass of wine (148 mL), or one 1½ oz glass of hard liquor (44 mL).  General information  Avoid eating more than 2,300 mg of salt a day. If you have hypertension, you may need to reduce your sodium intake to 1,500 mg a day.  Work with your health care provider to maintain a healthy body weight or to lose weight. Ask what an ideal weight is for you.  Get at least 30 minutes of exercise that causes your heart to beat faster (aerobic exercise) most days of the week. Activities may include walking, swimming, or biking.  Work with your health care provider or dietitian to adjust your eating plan to your individual calorie needs.  What foods should I eat?  Fruits  All fresh, dried, or frozen fruit. Canned fruit in natural juice (without added sugar).  Vegetables  Fresh or frozen vegetables (raw, steamed, roasted, or grilled). Low-sodium or reduced-sodium tomato and vegetable juice. Low-sodium or reduced-sodium tomato sauce and tomato paste. Low-sodium or reduced-sodium canned vegetables.  Grains  Whole-grain or whole-wheat bread. Whole-grain or whole-wheat pasta. Brown rice. Oatmeal. Quinoa.  Bulgur. Whole-grain and low-sodium cereals. Andreina bread. Low-fat, low-sodium crackers. Whole-wheat flour tortillas.  Meats and other proteins  Skinless chicken or turkey. Ground chicken or turkey. Pork with fat trimmed off. Fish and seafood. Egg whites. Dried beans, peas, or lentils. Unsalted nuts, nut butters, and seeds. Unsalted canned beans. Lean cuts of beef with fat trimmed off. Low-sodium, lean precooked or cured meat, such as sausages or meat loaves.  Dairy  Low-fat (1%) or fat-free (skim) milk. Reduced-fat, low-fat, or fat-free cheeses. Nonfat, low-sodium ricotta or cottage cheese. Low-fat or nonfat yogurt. Low-fat, low-sodium cheese.  Fats and oils  Soft margarine without trans fats. Vegetable oil. Reduced-fat, low-fat, or light mayonnaise and salad dressings (reduced-sodium). Canola, safflower, olive, avocado, soybean, and sunflower oils. Avocado.  Seasonings and condiments  Herbs. Spices. Seasoning mixes without salt.  Other foods  Unsalted popcorn and pretzels. Fat-free sweets.  The items listed above may not be a complete list of foods and beverages you can eat. Contact a dietitian for more information.  What foods should I avoid?  Fruits  Canned fruit in a light or heavy syrup. Fried fruit. Fruit in cream or butter sauce.  Vegetables  Creamed or fried vegetables. Vegetables in a cheese sauce. Regular canned vegetables (not low-sodium or reduced-sodium). Regular canned tomato sauce and paste (not low-sodium or reduced-sodium). Regular tomato and vegetable juice (not low-sodium or reduced-sodium). Pickles. Olives.  Grains  Baked goods made with fat, such as croissants, muffins, or some breads. Dry pasta or rice meal packs.  Meats and other proteins  Fatty cuts of meat. Ribs. Fried meat. Robles. Bologna, salami, and other precooked or cured meats, such as sausages or meat loaves. Fat from the back of a pig (fatback). Bratwurst. Salted nuts and seeds. Canned beans with added salt. Canned or smoked fish.  Whole eggs or egg yolks. Chicken or turkey with skin.  Dairy  Whole or 2% milk, cream, and half-and-half. Whole or full-fat cream cheese. Whole-fat or sweetened yogurt. Full-fat cheese. Nondairy creamers. Whipped toppings. Processed cheese and cheese spreads.  Fats and oils  Butter. Stick margarine. Lard. Shortening. Ghee. Robles fat. Tropical oils, such as coconut, palm kernel, or palm oil.  Seasonings and condiments  Onion salt, garlic salt, seasoned salt, table salt, and sea salt. Worcestershire sauce. Tartar sauce. Barbecue sauce. Teriyaki sauce. Soy sauce, including reduced-sodium. Steak sauce. Canned and packaged gravies. Fish sauce. Oyster sauce. Cocktail sauce. Store-bought horseradish. Ketchup. Mustard. Meat flavorings and tenderizers. Bouillon cubes. Hot sauces. Pre-made or packaged marinades. Pre-made or packaged taco seasonings. Relishes. Regular salad dressings.  Other foods  Salted popcorn and pretzels.  The items listed above may not be a complete list of foods and beverages you should avoid. Contact a dietitian for more information.  Where to find more information  National Heart, Lung, and Blood [place]: www.nhlbi.nih.gov  American Heart Association: www.heart.org  Academy of Nutrition and Dietetics: www.eatright.org  National Kidney Foundation: www.kidney.org  Summary  The DASH eating plan is a healthy eating plan that has been shown to reduce high blood pressure (hypertension). It may also reduce your risk for type 2 diabetes, heart disease, and stroke.  When on the DASH eating plan, aim to eat more fresh fruits and vegetables, whole grains, lean proteins, low-fat dairy, and heart-healthy fats.  With the DASH eating plan, you should limit salt (sodium) intake to 2,300 mg a day. If you have hypertension, you may need to reduce your sodium intake to 1,500 mg a day.  Work with your health care provider or dietitian to adjust your eating plan to your individual calorie needs.  This information is not  intended to replace advice given to you by your health care provider. Make sure you discuss any questions you have with your health care provider.  Document Revised: 11/20/2020 Document Reviewed: 11/20/2020  [employer] Patient Education © 2022 [employer]  Fall Prevention in the Home, Adult  Falls can cause injuries and can happen to people of all ages. There are many things you can do to make your home safe and to help prevent falls. Ask for help when making these changes.  What actions can I take to prevent falls?  General Instructions  Use good lighting in all rooms. Replace any light bulbs that burn out.  Turn on the lights in dark areas. Use night-lights.  Keep items that you use often in easy-to-reach places. Lower the shelves around your home if needed.  Set up your furniture so you have a clear path. Avoid moving your furniture around.  Do not have throw rugs or other things on the floor that can make you trip.  Avoid walking on wet floors.  If any of your floors are uneven, fix them.  Add color or contrast paint or tape to clearly fish and help you see:  Grab bars or handrails.  First and last steps of staircases.  Where the edge of each step is.  If you use a stepladder:  Make sure that it is fully opened. Do not climb a closed stepladder.  Make sure the sides of the stepladder are locked in place.  Ask someone to hold the stepladder while you use it.  Know where your pets are when moving through your home.  What can I do in the bathroom?     Keep the floor dry. Clean up any water on the floor right away.  Remove soap buildup in the tub or shower.  Use nonskid mats or decals on the floor of the tub or shower.  Attach bath mats securely with double-sided, nonslip rug tape.  If you need to sit down in the shower, use a plastic, nonslip stool.  Install grab bars by the toilet and in the tub and shower. Do not use towel bars as grab bars.  What can I do in the bedroom?  Make sure that you have a light by your  bed that is easy to reach.  Do not use any sheets or blankets for your bed that hang to the floor.  Have a firm chair with side arms that you can use for support when you get dressed.  What can I do in the kitchen?  Clean up any spills right away.  If you need to reach something above you, use a step stool with a grab bar.  Keep electrical cords out of the way.  Do not use floor polish or wax that makes floors slippery.  What can I do with my stairs?  Do not leave any items on the stairs.  Make sure that you have a light switch at the top and the bottom of the stairs.  Make sure that there are handrails on both sides of the stairs. Fix handrails that are broken or loose.  Install nonslip stair treads on all your stairs.  Avoid having throw rugs at the top or bottom of the stairs.  Choose a carpet that does not hide the edge of the steps on the stairs.  Check carpeting to make sure that it is firmly attached to the stairs. Fix carpet that is loose or worn.  What can I do on the outside of my home?  Use bright outdoor lighting.  Fix the edges of walkways and driveways and fix any cracks.  Remove anything that might make you trip as you walk through a door, such as a raised step or threshold.  Trim any bushes or trees on paths to your home.  Check to see if handrails are loose or broken and that both sides of all steps have handrails.  Install guardrails along the edges of any raised decks and porches.  Clear paths of anything that can make you trip, such as tools or rocks.  Have leaves, snow, or ice cleared regularly.  Use sand or salt on paths during winter.  Clean up any spills in your garage right away. This includes grease or oil spills.  What other actions can I take?  Wear shoes that:  Have a low heel. Do not wear high heels.  Have rubber bottoms.  Feel good on your feet and fit well.  Are closed at the toe. Do not wear open-toe sandals.  Use tools that help you move around if needed. These  include:  Canes.  Walkers.  Scooters.  Crutches.  Review your medicines with your doctor. Some medicines can make you feel dizzy. This can increase your chance of falling.  Ask your doctor what else you can do to help prevent falls.  Where to find more information  Centers for Disease Control and PreventionTIANNA: www.cdc.gov  National [place] on Aging: www.jacqueline.nih.gov  Contact a doctor if:  You are afraid of falling at home.  You feel weak, drowsy, or dizzy at home.  You fall at home.  Summary  There are many simple things that you can do to make your home safe and to help prevent falls.  Ways to make your home safe include removing things that can make you trip and installing grab bars in the bathroom.  Ask for help when making these changes in your home.  This information is not intended to replace advice given to you by your health care provider. Make sure you discuss any questions you have with your health care provider.  Document Revised: 09/19/2022 Document Reviewed: 07/21/2021  [employer] Patient Education © 2022 [employer]

## 2023-05-22 NOTE — PROGRESS NOTES
A My-Chart message has been sent to the patient for PATIENT ROUNDING with [facility].

## 2023-07-31 ENCOUNTER — OFFICE VISIT (OUTPATIENT)
Dept: NEUROSURGERY | Facility: CLINIC | Age: 63
End: 2023-07-31
Payer: COMMERCIAL

## 2023-07-31 ENCOUNTER — HOSPITAL ENCOUNTER (OUTPATIENT)
Dept: GENERAL RADIOLOGY | Facility: HOSPITAL | Age: 63
Discharge: HOME | End: 2023-07-31
Admitting: NURSE PRACTITIONER
Payer: COMMERCIAL

## 2023-07-31 VITALS — WEIGHT: 229 LBS | BODY MASS INDEX: 40.57 KG/M2 | HEIGHT: 63 IN

## 2023-07-31 DIAGNOSIS — E66.01 CLASS 3 SEVERE OBESITY DUE TO EXCESS CALORIES WITH SERIOUS COMORBIDITY AND BODY MASS INDEX (BMI) OF 40.0 TO 44.9 IN ADULT: ICD-10-CM

## 2023-07-31 DIAGNOSIS — R29.6 RECURRENT FALLS: ICD-10-CM

## 2023-07-31 DIAGNOSIS — M43.10 ANTEROLISTHESIS: ICD-10-CM

## 2023-07-31 DIAGNOSIS — S22.080D CLOSED WEDGE COMPRESSION FRACTURE OF T11 VERTEBRA WITH ROUTINE HEALING, SUBSEQUENT ENCOUNTER: Primary | ICD-10-CM

## 2023-07-31 DIAGNOSIS — M48.061 SPINAL STENOSIS, LUMBAR REGION, WITHOUT NEUROGENIC CLAUDICATION: ICD-10-CM

## 2023-07-31 DIAGNOSIS — R26.81 UNSTEADY GAIT: ICD-10-CM

## 2023-07-31 PROCEDURE — 72120 X-RAY BEND ONLY L-S SPINE: CPT

## 2023-08-30 ENCOUNTER — OFFICE VISIT (OUTPATIENT)
Dept: CARDIOLOGY | Facility: CLINIC | Age: 63
End: 2023-08-30
Payer: COMMERCIAL

## 2023-08-30 VITALS
WEIGHT: 229 LBS | DIASTOLIC BLOOD PRESSURE: 98 MMHG | OXYGEN SATURATION: 96 % | HEART RATE: 68 BPM | BODY MASS INDEX: 40.57 KG/M2 | HEIGHT: 63 IN | SYSTOLIC BLOOD PRESSURE: 142 MMHG

## 2023-08-30 DIAGNOSIS — E78.5 DYSLIPIDEMIA: ICD-10-CM

## 2023-08-30 DIAGNOSIS — I10 ESSENTIAL HYPERTENSION: ICD-10-CM

## 2023-08-30 DIAGNOSIS — I47.1 PAROXYSMAL SVT (SUPRAVENTRICULAR TACHYCARDIA): Primary | ICD-10-CM

## 2023-08-30 PROCEDURE — 99214 OFFICE O/P EST MOD 30 MIN: CPT | Performed by: INTERNAL MEDICINE

## 2023-08-30 PROCEDURE — 93000 ELECTROCARDIOGRAM COMPLETE: CPT | Performed by: INTERNAL MEDICINE

## 2023-08-30 NOTE — LETTER
August 30, 2023     Mino Hager MD  [address] [facility] [address]    Patient: Shannon Coombs   YOB: 1960   Date of Visit: 8/30/2023       Dear Mino Hager MD    Shannon Coombs was in my office today. Below is a copy of my note.    If you have questions, please do not hesitate to call me. I look forward to following Shannon along with you.         Sincerely,        Adrian Escalera MD        CC: No Recipients      Reason for Visit: cardiovascular follow up.    HPI:  Shannon Coombs is a 63 y.o. female is here today for follow-up.  Blood pressure has been running high.  She is going to physical therapy for her back.  She is hoping to do these exercises at home more frequently.  She is noticing more varicose veins in her right leg.  She tries to eat healthy and avoids processed foods.  She also eats a lot of fresh vegetables.     Previous Cardiac Testing and Procedures:  - Echo (6/25/14) EF 65%, grade 2 diastolic dysfunction, mild MR and TR, RVSP 48 mmHg  - Holter monitor (04/14/2017) 21,180 one atrial ectopic beats, runs of nonsustained SVT up to 5 beats at 167 bpm  - Echo (05/16/2017) EF 65%, normal diastolic function, normal valves  - Lower extremity venous ultrasound (12/1/2022) normal duplex of the left lower extremity with no evidence of DVT     Lab data:  - Lipid panel (07/28/2018) total cholesterol 141, HDL 45, LDL 79, triglycerides 86  - Lipid panel (2/15/2019) total cholesterol 165, HDL 60, LDL 85, triglycerides 102  - Lipid panel (6/8/2020) total cholesterol 168, HDL 64, LDL 91, triglycerides 65  - Lipid panel (3/10/2021) total cholesterol 199, HDL 63, , triglycerides 78  - Lipid panel (4/8/2022) total cholesterol 158, HDL 49, LDL 88, triglycerides 105    Patient Active Problem List   Diagnosis    Dizziness    Fatigue    Syncope and collapse    Paroxysmal SVT (supraventricular tachycardia)    Morbid obesity with BMI of 40.0-44.9,  adult    Dyslipidemia    Essential hypertension       Social History     Tobacco Use    Smoking status: Former     Packs/day: 0.50     Years: 35.00     Pack years: 17.50     Types: Cigarettes     Quit date: 2019     Years since quittin.5    Smokeless tobacco: Never   Vaping Use    Vaping Use: Never used   Substance Use Topics    Alcohol use: Yes     Comment: rarely    Drug use: No       Family History   Problem Relation Age of Onset    Dementia Mother     Hyperlipidemia Mother     Heart disease Father     Hyperlipidemia Father        The following portions of the patient's history were reviewed and updated as appropriate: allergies, current medications, past family history, past medical history, past social history, past surgical history, and problem list.      Current Outpatient Medications:     acetaminophen (TYLENOL) 650 MG 8 hr tablet, Take 1 tablet by mouth Every 8 (Eight) Hours As Needed for Mild Pain., Disp: , Rfl:     atorvastatin (LIPITOR) 40 MG tablet, Take 1 tablet by mouth Daily. Indications: High Amount of Fats in the Blood, Disp: , Rfl:     Calcium Carbonate-Vitamin D (CALTRATE 600+D PO), , Disp: , Rfl:     Cyanocobalamin ER 1000 MCG tablet controlled-release, Daily., Disp: , Rfl:     famotidine (PEPCID) 40 MG tablet, , Disp: , Rfl:     metoprolol succinate XL (TOPROL-XL) 25 MG 24 hr tablet, Take 1 tablet by mouth Daily. Indications: High Blood Pressure Disorder, Disp: , Rfl:     nicotine polacrilex (NICORETTE) 4 MG gum, Chew 1 each As Needed for Smoking Cessation. Indications: Nicotine Addiction, Disp: , Rfl:     Probiotic Product (PROBIOTIC-10 PO), Take 1 capsule by mouth 2 (Two) Times a Day. Indications: maintenance, Disp: , Rfl:     spironolactone (ALDACTONE) 25 MG tablet, Take 1 tablet by mouth Daily. Indications: Edema, Disp: , Rfl:     vitamin B-12 (CYANOCOBALAMIN) 1000 MCG tablet, Take 1 tablet by mouth Daily. Indications: Inadequate Vitamin B12, Disp: , Rfl:  "    Review of Systems   Constitutional: Negative for chills and fever.   Cardiovascular:  Positive for leg swelling (vericose veins). Negative for chest pain and paroxysmal nocturnal dyspnea.   Respiratory:  Negative for cough and shortness of breath.    Skin:  Negative for rash.   Gastrointestinal:  Negative for abdominal pain and heartburn.   Neurological:  Negative for dizziness and numbness.     Objective  /98 (BP Location: Left arm, Patient Position: Sitting, Cuff Size: Adult)   Pulse 68   Ht 160 cm (62.99\")   Wt 104 kg (229 lb)   SpO2 96%   BMI 40.58 kg/mý   Constitutional:       Appearance: Well-developed. Morbidly obese.   HENT:      Head: Normocephalic and atraumatic.   Pulmonary:      Effort: Pulmonary effort is normal.      Breath sounds: Normal breath sounds.   Cardiovascular:      Normal rate. Regular rhythm.      Murmurs: There is no murmur.      No gallop.  No click.   Edema:     Peripheral edema absent.   Skin:     General: Skin is warm and dry.   Neurological:      Mental Status: Alert and oriented to person, place, and time.       ECG 12 Lead    Date/Time: 8/30/2023 10:46 AM  Performed by: Adrian Escalera MD  Authorized by: Adrian Escalera MD   Comparison: compared with previous ECG from 8/24/2022  Similar to previous ECG  Rhythm: sinus rhythm  Ectopy: atrial premature contractions  Rate: normal  Q waves: III and aVF            ICD-10-CM ICD-9-CM   1. Paroxysmal SVT (supraventricular tachycardia)  I47.1 427.0   2. Essential hypertension  I10 401.9   3. Dyslipidemia  E78.5 272.4         Assessment/Plan:  1.  Paroxysmal SVT: Patient denies any significant palpitations or other symptoms of arrhythmia at this time.  Continue metoprolol.      2.  Essential hypertension: Blood pressure is elevated today and also running high at home.  We discussed adjusting medications versus trial of additional lifestyle modification.  She plans to start exercising more and therefore elects for lifestyle " changes.  She is encouraged to keep a log at home.  Continue metoprolol and spironolactone.  Can potentially titrate up spironolactone to 50 mg versus adding an additional blood pressure medicine.        3.  Dyslipidemia: Continue atorvastatin and obtain copy of last lipid panel from PCP.

## 2023-08-30 NOTE — PROGRESS NOTES
Reason for Visit: cardiovascular follow up.    HPI:  Shannon Coombs is a 63 y.o. female is here today for follow-up.  Blood pressure has been running high.  She is going to physical therapy for her back.  She is hoping to do these exercises at home more frequently.  She is noticing more varicose veins in her right leg.  She tries to eat healthy and avoids processed foods.  She also eats a lot of fresh vegetables.     Previous Cardiac Testing and Procedures:  - Echo (14) EF 65%, grade 2 diastolic dysfunction, mild MR and TR, RVSP 48 mmHg  - Holter monitor (2017) 21,180 one atrial ectopic beats, runs of nonsustained SVT up to 5 beats at 167 bpm  - Echo (2017) EF 65%, normal diastolic function, normal valves  - Lower extremity venous ultrasound (2022) normal duplex of the left lower extremity with no evidence of DVT     Lab data:  - Lipid panel (2018) total cholesterol 141, HDL 45, LDL 79, triglycerides 86  - Lipid panel (2/15/2019) total cholesterol 165, HDL 60, LDL 85, triglycerides 102  - Lipid panel (2020) total cholesterol 168, HDL 64, LDL 91, triglycerides 65  - Lipid panel (3/10/2021) total cholesterol 199, HDL 63, , triglycerides 78  - Lipid panel (2022) total cholesterol 158, HDL 49, LDL 88, triglycerides 105    Patient Active Problem List   Diagnosis    Dizziness    Fatigue    Syncope and collapse    Paroxysmal SVT (supraventricular tachycardia)    Morbid obesity with BMI of 40.0-44.9, adult    Dyslipidemia    Essential hypertension       Social History     Tobacco Use    Smoking status: Former     Packs/day: 0.50     Years: 35.00     Pack years: 17.50     Types: Cigarettes     Quit date: 2019     Years since quittin.5    Smokeless tobacco: Never   Vaping Use    Vaping Use: Never used   Substance Use Topics    Alcohol use: Yes     Comment: rarely    Drug use: No       Family History   Problem Relation Age of Onset    Dementia Mother      Hyperlipidemia Mother     Heart disease Father     Hyperlipidemia Father        The following portions of the patient's history were reviewed and updated as appropriate: allergies, current medications, past family history, past medical history, past social history, past surgical history, and problem list.      Current Outpatient Medications:     acetaminophen (TYLENOL) 650 MG 8 hr tablet, Take 1 tablet by mouth Every 8 (Eight) Hours As Needed for Mild Pain., Disp: , Rfl:     atorvastatin (LIPITOR) 40 MG tablet, Take 1 tablet by mouth Daily. Indications: High Amount of Fats in the Blood, Disp: , Rfl:     Calcium Carbonate-Vitamin D (CALTRATE 600+D PO), , Disp: , Rfl:     Cyanocobalamin ER 1000 MCG tablet controlled-release, Daily., Disp: , Rfl:     famotidine (PEPCID) 40 MG tablet, , Disp: , Rfl:     metoprolol succinate XL (TOPROL-XL) 25 MG 24 hr tablet, Take 1 tablet by mouth Daily. Indications: High Blood Pressure Disorder, Disp: , Rfl:     nicotine polacrilex (NICORETTE) 4 MG gum, Chew 1 each As Needed for Smoking Cessation. Indications: Nicotine Addiction, Disp: , Rfl:     Probiotic Product (PROBIOTIC-10 PO), Take 1 capsule by mouth 2 (Two) Times a Day. Indications: maintenance, Disp: , Rfl:     spironolactone (ALDACTONE) 25 MG tablet, Take 1 tablet by mouth Daily. Indications: Edema, Disp: , Rfl:     vitamin B-12 (CYANOCOBALAMIN) 1000 MCG tablet, Take 1 tablet by mouth Daily. Indications: Inadequate Vitamin B12, Disp: , Rfl:     Review of Systems   Constitutional: Negative for chills and fever.   Cardiovascular:  Positive for leg swelling (vericose veins). Negative for chest pain and paroxysmal nocturnal dyspnea.   Respiratory:  Negative for cough and shortness of breath.    Skin:  Negative for rash.   Gastrointestinal:  Negative for abdominal pain and heartburn.   Neurological:  Negative for dizziness and numbness.     Objective   /98 (BP Location: Left arm, Patient Position: Sitting, Cuff Size: Adult)   " Pulse 68   Ht 160 cm (62.99\")   Wt 104 kg (229 lb)   SpO2 96%   BMI 40.58 kg/mý   Constitutional:       Appearance: Well-developed. Morbidly obese.   HENT:      Head: Normocephalic and atraumatic.   Pulmonary:      Effort: Pulmonary effort is normal.      Breath sounds: Normal breath sounds.   Cardiovascular:      Normal rate. Regular rhythm.      Murmurs: There is no murmur.      No gallop.  No click.   Edema:     Peripheral edema absent.   Skin:     General: Skin is warm and dry.   Neurological:      Mental Status: Alert and oriented to person, place, and time.       ECG 12 Lead    Date/Time: 8/30/2023 10:46 AM  Performed by: Adrian Escalera MD  Authorized by: Adrian Escalera MD   Comparison: compared with previous ECG from 8/24/2022  Similar to previous ECG  Rhythm: sinus rhythm  Ectopy: atrial premature contractions  Rate: normal  Q waves: III and aVF            ICD-10-CM ICD-9-CM   1. Paroxysmal SVT (supraventricular tachycardia)  I47.1 427.0   2. Essential hypertension  I10 401.9   3. Dyslipidemia  E78.5 272.4         Assessment/Plan:  1.  Paroxysmal SVT: Patient denies any significant palpitations or other symptoms of arrhythmia at this time.  Continue metoprolol.      2.  Essential hypertension: Blood pressure is elevated today and also running high at home.  We discussed adjusting medications versus trial of additional lifestyle modification.  She plans to start exercising more and therefore elects for lifestyle changes.  She is encouraged to keep a log at home.  Continue metoprolol and spironolactone.  Can potentially titrate up spironolactone to 50 mg versus adding an additional blood pressure medicine.        3.  Dyslipidemia: Continue atorvastatin and obtain copy of last lipid panel from PCP.      "

## 2024-04-24 ENCOUNTER — OFFICE VISIT (OUTPATIENT)
Dept: CARDIOLOGY | Facility: CLINIC | Age: 64
End: 2024-04-24
Payer: COMMERCIAL

## 2024-04-24 VITALS
DIASTOLIC BLOOD PRESSURE: 84 MMHG | OXYGEN SATURATION: 97 % | SYSTOLIC BLOOD PRESSURE: 138 MMHG | HEART RATE: 77 BPM | BODY MASS INDEX: 42.76 KG/M2 | HEIGHT: 62 IN | WEIGHT: 232.4 LBS

## 2024-04-24 DIAGNOSIS — I47.10 PAROXYSMAL SVT (SUPRAVENTRICULAR TACHYCARDIA): Primary | ICD-10-CM

## 2024-04-24 DIAGNOSIS — I10 ESSENTIAL HYPERTENSION: ICD-10-CM

## 2024-04-24 DIAGNOSIS — E78.5 DYSLIPIDEMIA: ICD-10-CM

## 2024-04-24 PROCEDURE — 99214 OFFICE O/P EST MOD 30 MIN: CPT | Performed by: INTERNAL MEDICINE

## 2024-04-24 PROCEDURE — 93000 ELECTROCARDIOGRAM COMPLETE: CPT | Performed by: INTERNAL MEDICINE

## 2024-04-24 NOTE — LETTER
April 24, 2024     Mino Hager MD  [address] [facility] [address]    Patient: Shannon Coombs   YOB: 1960   Date of Visit: 4/24/2024       Dear Mino Hager MD    Shannon Coombs was in my office today. Below is a copy of my note.    If you have questions, please do not hesitate to call me. I look forward to following Shannon along with you.         Sincerely,        Adrian Escalera MD        CC: No Recipients      Reason for Visit: cardiovascular follow up.    HPI:  Shannon Coombs is a 63 y.o. female is here today for follow-up.  She follows in cardiology clinic secondary to paroxysmal SVT.  Dr Hager recently increased atorvastatin and plans to repeat lipid panel in a month.  These results are not available today.  She gets on her bike regularly and also is active around her house.  She eats relatively healthy and does most of her cooking from scratch.  She has also been active in her garden.  She denies any chest pain, palpitations, dizziness, syncope, PND, or orthopnea.      Previous Cardiac Testing and Procedures:  - Echo (6/25/14) EF 65%, grade 2 diastolic dysfunction, mild MR and TR, RVSP 48 mmHg  - Holter monitor (04/14/2017) 21,180 one atrial ectopic beats, runs of nonsustained SVT up to 5 beats at 167 bpm  - Echo (05/16/2017) EF 65%, normal diastolic function, normal valves  - Lower extremity venous ultrasound (12/1/2022) normal duplex of the left lower extremity with no evidence of DVT     Lab data:  - Lipid panel (07/28/2018) total cholesterol 141, HDL 45, LDL 79, triglycerides 86  - Lipid panel (2/15/2019) total cholesterol 165, HDL 60, LDL 85, triglycerides 102  - Lipid panel (6/8/2020) total cholesterol 168, HDL 64, LDL 91, triglycerides 65  - Lipid panel (3/10/2021) total cholesterol 199, HDL 63, , triglycerides 78  - Lipid panel (4/8/2022) total cholesterol 158, HDL 49, LDL 88, triglycerides 105    Patient Active Problem List    Diagnosis   • Dizziness   • Fatigue   • Syncope and collapse   • Paroxysmal SVT (supraventricular tachycardia)   • Morbid obesity with BMI of 40.0-44.9, adult   • Dyslipidemia   • Essential hypertension       Social History     Tobacco Use   • Smoking status: Former     Current packs/day: 0.00     Average packs/day: 0.5 packs/day for 35.0 years (17.5 ttl pk-yrs)     Types: Cigarettes     Start date: 1984     Quit date: 2019     Years since quittin.2   • Smokeless tobacco: Never   Vaping Use   • Vaping status: Never Used   Substance Use Topics   • Alcohol use: Yes     Comment: rarely   • Drug use: No       Family History   Problem Relation Age of Onset   • Dementia Mother    • Hyperlipidemia Mother    • Heart disease Father    • Hyperlipidemia Father        The following portions of the patient's history were reviewed and updated as appropriate: allergies, current medications, past family history, past medical history, past social history, past surgical history, and problem list.      Current Outpatient Medications:   •  acetaminophen (TYLENOL) 650 MG 8 hr tablet, Take 1 tablet by mouth Every 8 (Eight) Hours As Needed for Mild Pain., Disp: , Rfl:   •  atorvastatin (LIPITOR) 40 MG tablet, Take 2 tablets by mouth Daily. Indications: High Amount of Fats in the Blood, Disp: , Rfl:   •  Calcium Carbonate-Vitamin D (CALTRATE 600+D PO), , Disp: , Rfl:   •  Cyanocobalamin ER 1000 MCG tablet controlled-release, Daily., Disp: , Rfl:   •  famotidine (PEPCID) 40 MG tablet, , Disp: , Rfl:   •  metoprolol succinate XL (TOPROL-XL) 25 MG 24 hr tablet, Take 1 tablet by mouth Daily. Indications: High Blood Pressure Disorder, Disp: , Rfl:   •  nicotine polacrilex (NICORETTE) 4 MG gum, Chew 1 each As Needed for Smoking Cessation. Indications: Nicotine Addiction, Disp: , Rfl:   •  Probiotic Product (PROBIOTIC-10 PO), Take 1 capsule by mouth 2 (Two) Times a Day. Indications: maintenance, Disp: , Rfl:   •  spironolactone  "(ALDACTONE) 25 MG tablet, Take 1 tablet by mouth Daily. Indications: Edema, Disp: , Rfl:   •  vitamin B-12 (CYANOCOBALAMIN) 1000 MCG tablet, Take 1 tablet by mouth Daily. Indications: Inadequate Vitamin B12, Disp: , Rfl:     Review of Systems   Constitutional: Negative for chills and fever.   Cardiovascular:  Positive for leg swelling (varicose veins). Negative for chest pain and paroxysmal nocturnal dyspnea.   Respiratory:  Negative for cough and shortness of breath.    Skin:  Negative for rash.   Gastrointestinal:  Negative for abdominal pain and heartburn.   Neurological:  Negative for dizziness and numbness.       Objective  /84 (BP Location: Left arm, Patient Position: Sitting, Cuff Size: Adult)   Pulse 77   Ht 157.5 cm (62\")   Wt 105 kg (232 lb 6.4 oz)   SpO2 97%   BMI 42.51 kg/m²   Constitutional:       Appearance: Well-developed.   HENT:      Head: Normocephalic and atraumatic.   Pulmonary:      Effort: Pulmonary effort is normal.      Breath sounds: Normal breath sounds.   Cardiovascular:      Normal rate. Regular rhythm.      Murmurs: There is no murmur.      No gallop.  No click. No rub.   Edema:     Peripheral edema absent.   Skin:     General: Skin is warm and dry.   Neurological:      Mental Status: Alert and oriented to person, place, and time.         ECG 12 Lead    Date/Time: 4/24/2024 2:57 PM  Performed by: Adrian Escalera MD    Authorized by: Adrian Escalera MD  Comparison: compared with previous ECG from 8/30/2023  Similar to previous ECG  Rhythm: sinus rhythm  Rate: normal  Q waves: III and aVF              ICD-10-CM ICD-9-CM   1. Paroxysmal SVT (supraventricular tachycardia)  I47.10 427.0   2. Essential hypertension  I10 401.9   3. Dyslipidemia  E78.5 272.4         Assessment/Plan:  1.  Paroxysmal SVT: NSR on EKG today and no current symptoms.  Continue metoprolol.      2.  Essential hypertension: Blood pressure is mildly elevated today with acceptable control at home.  She elects " to continue medical therapy along with lifestyle modification.          3.  Dyslipidemia: Acceptable control on 4/8/2022.  Continue atorvastatin and again attempt to obtain copy of most recent lipid panel.

## 2024-04-24 NOTE — PROGRESS NOTES
Reason for Visit: cardiovascular follow up.    HPI:  Shannon Coombs is a 63 y.o. female is here today for follow-up.  She follows in cardiology clinic secondary to paroxysmal SVT.  Dr Hager recently increased atorvastatin and plans to repeat lipid panel in a month.  These results are not available today.  She gets on her bike regularly and also is active around her house.  She eats relatively healthy and does most of her cooking from scratch.  She has also been active in her garden.  She denies any chest pain, palpitations, dizziness, syncope, PND, or orthopnea.  She is concerned because her father has PAD.     Previous Cardiac Testing and Procedures:  - Echo (6/25/14) EF 65%, grade 2 diastolic dysfunction, mild MR and TR, RVSP 48 mmHg  - Holter monitor (04/14/2017) 21,180 one atrial ectopic beats, runs of nonsustained SVT up to 5 beats at 167 bpm  - Echo (05/16/2017) EF 65%, normal diastolic function, normal valves  - Lower extremity venous ultrasound (12/1/2022) normal duplex of the left lower extremity with no evidence of DVT     Lab data:  - Lipid panel (07/28/2018) total cholesterol 141, HDL 45, LDL 79, triglycerides 86  - Lipid panel (2/15/2019) total cholesterol 165, HDL 60, LDL 85, triglycerides 102  - Lipid panel (6/8/2020) total cholesterol 168, HDL 64, LDL 91, triglycerides 65  - Lipid panel (3/10/2021) total cholesterol 199, HDL 63, , triglycerides 78  - Lipid panel (4/8/2022) total cholesterol 158, HDL 49, LDL 88, triglycerides 105    Patient Active Problem List   Diagnosis    Dizziness    Fatigue    Syncope and collapse    Paroxysmal SVT (supraventricular tachycardia)    Morbid obesity with BMI of 40.0-44.9, adult    Dyslipidemia    Essential hypertension       Social History     Tobacco Use    Smoking status: Former     Current packs/day: 0.00     Average packs/day: 0.5 packs/day for 35.0 years (17.5 ttl pk-yrs)     Types: Cigarettes     Start date: 2/2/1984     Quit date:  2019     Years since quittin.2    Smokeless tobacco: Never   Vaping Use    Vaping status: Never Used   Substance Use Topics    Alcohol use: Yes     Comment: rarely    Drug use: No       Family History   Problem Relation Age of Onset    Dementia Mother     Hyperlipidemia Mother     Heart disease Father     Hyperlipidemia Father        The following portions of the patient's history were reviewed and updated as appropriate: allergies, current medications, past family history, past medical history, past social history, past surgical history, and problem list.      Current Outpatient Medications:     acetaminophen (TYLENOL) 650 MG 8 hr tablet, Take 1 tablet by mouth Every 8 (Eight) Hours As Needed for Mild Pain., Disp: , Rfl:     atorvastatin (LIPITOR) 40 MG tablet, Take 2 tablets by mouth Daily. Indications: High Amount of Fats in the Blood, Disp: , Rfl:     Calcium Carbonate-Vitamin D (CALTRATE 600+D PO), , Disp: , Rfl:     Cyanocobalamin ER 1000 MCG tablet controlled-release, Daily., Disp: , Rfl:     famotidine (PEPCID) 40 MG tablet, , Disp: , Rfl:     metoprolol succinate XL (TOPROL-XL) 25 MG 24 hr tablet, Take 1 tablet by mouth Daily. Indications: High Blood Pressure Disorder, Disp: , Rfl:     nicotine polacrilex (NICORETTE) 4 MG gum, Chew 1 each As Needed for Smoking Cessation. Indications: Nicotine Addiction, Disp: , Rfl:     Probiotic Product (PROBIOTIC-10 PO), Take 1 capsule by mouth 2 (Two) Times a Day. Indications: maintenance, Disp: , Rfl:     spironolactone (ALDACTONE) 25 MG tablet, Take 1 tablet by mouth Daily. Indications: Edema, Disp: , Rfl:     vitamin B-12 (CYANOCOBALAMIN) 1000 MCG tablet, Take 1 tablet by mouth Daily. Indications: Inadequate Vitamin B12, Disp: , Rfl:     Review of Systems   Constitutional: Negative for chills and fever.   Cardiovascular:  Positive for leg swelling (varicose veins). Negative for chest pain and paroxysmal nocturnal dyspnea.   Respiratory:  Negative for cough and  "shortness of breath.    Skin:  Negative for rash.   Gastrointestinal:  Negative for abdominal pain and heartburn.   Neurological:  Negative for dizziness and numbness.       Objective   /84 (BP Location: Left arm, Patient Position: Sitting, Cuff Size: Adult)   Pulse 77   Ht 157.5 cm (62\")   Wt 105 kg (232 lb 6.4 oz)   SpO2 97%   BMI 42.51 kg/m²   Constitutional:       Appearance: Well-developed.   HENT:      Head: Normocephalic and atraumatic.   Pulmonary:      Effort: Pulmonary effort is normal.      Breath sounds: Normal breath sounds.   Cardiovascular:      Normal rate. Regular rhythm.      Murmurs: There is no murmur.      No gallop.  No click. No rub.   Edema:     Peripheral edema absent.   Skin:     General: Skin is warm and dry.   Neurological:      Mental Status: Alert and oriented to person, place, and time.         ECG 12 Lead    Date/Time: 4/24/2024 2:57 PM  Performed by: Adrian Escalera MD    Authorized by: Adrian Escalera MD  Comparison: compared with previous ECG from 8/30/2023  Similar to previous ECG  Rhythm: sinus rhythm  Rate: normal  Q waves: III and aVF              ICD-10-CM ICD-9-CM   1. Paroxysmal SVT (supraventricular tachycardia)  I47.10 427.0   2. Essential hypertension  I10 401.9   3. Dyslipidemia  E78.5 272.4         Assessment/Plan:  1.  Paroxysmal SVT: NSR on EKG today and no current symptoms.  Continue metoprolol.      2.  Essential hypertension: Blood pressure is mildly elevated today with acceptable control at home.  She elects to continue medical therapy along with lifestyle modification.          3.  Dyslipidemia: Acceptable control on 4/8/2022.  Continue atorvastatin and again attempt to obtain copy of most recent lipid panel.  "

## 2024-05-03 ENCOUNTER — TELEPHONE (OUTPATIENT)
Dept: CARDIOLOGY | Facility: CLINIC | Age: 64
End: 2024-05-03
Payer: COMMERCIAL

## 2024-08-19 ENCOUNTER — TELEPHONE (OUTPATIENT)
Dept: CARDIOLOGY | Facility: CLINIC | Age: 64
End: 2024-08-19
Payer: COMMERCIAL

## 2024-08-19 NOTE — TELEPHONE ENCOUNTER
Patient called wanting to come in today. She states that she has been having pressure in her head all day. She took Spironlactone last night and Metoprolol this morning and she also took 2 baby Asprin  this morning. She states her BP was 182/97 this morning and now it's 188/103 and her BP is 64.

## 2024-08-20 RX ORDER — LISINOPRIL 10 MG/1
10 TABLET ORAL DAILY
Qty: 30 TABLET | Refills: 11 | Status: SHIPPED | OUTPATIENT
Start: 2024-08-20

## 2024-08-20 NOTE — TELEPHONE ENCOUNTER
Advise that Dr. Escalera sent in Lisinopril 10mg to [facility] . Patient voiced understanding and she is going to keep a log and call in the next 2 weeks.

## 2024-09-10 ENCOUNTER — TELEPHONE (OUTPATIENT)
Dept: CARDIOLOGY | Facility: CLINIC | Age: 64
End: 2024-09-10
Payer: COMMERCIAL

## 2024-09-10 NOTE — TELEPHONE ENCOUNTER
----- Message from Adrian Escalera sent at 9/10/2024  9:30 AM CDT -----  Overall, blood pressure appears acceptable.  The symptoms she describes appear mild and self-limited.  ----- Message -----  From: Radha Smyth MA  Sent: 9/6/2024   3:29 PM CDT  To: Adrian Escalera MD      ----- Message -----  From: Brianna Montoya MA  Sent: 9/6/2024  11:28 AM CDT  To: Adrian Escalera MD; Radha Smyth MA    This was faxed in by patient. Please review. Thank you

## 2024-09-10 NOTE — TELEPHONE ENCOUNTER
Pt notified. She is currently at her pharmacy getting her bp machine check. She was told by the pharmacist to get her potassium check since she takes spironolactone. I advised her to reached out to her pcp since they prescribed her the spironolactone.     Lastly, she wants to be seen in 6 mo rather than a year with Dr Escalera. Therefore, she moved her appt from  4/16/25 to 10/2/24 with Dr Escalera.

## 2024-09-10 NOTE — TELEPHONE ENCOUNTER
Patient presented to our [facility] cardiology office for BP cuff comparison. Patients home monitor read 98/75 and our manual in office reading was 126/82.    Advised pt to continue to monitor BP and bring log to next appt.   She was speaking with Ivonne during this time via telephone and she said she would follow up with pcp regarding labs for potassium etc.    I indexed her BP log she brought to office today.    Thank you

## 2024-09-25 ENCOUNTER — TELEPHONE (OUTPATIENT)
Dept: CARDIOLOGY | Facility: CLINIC | Age: 64
End: 2024-09-25

## 2024-09-25 NOTE — TELEPHONE ENCOUNTER
Caller: Shannon Cornelius    Relationship to patient: Self    Best call back number: [phone]     Chief complaint: BLOOD PRESSURE     Type of visit: FOLLOW UP       Additional notes:PATIENT STATES BLOOD PRESSURE IS BETTER WITH MEDICATION AND IS OK WITH APPOINTMENT BEING EXTENDED DUE TO AVAILABLE.PLEASE ADVISE IF TIMEFRAME IS OK.

## 2024-09-25 NOTE — TELEPHONE ENCOUNTER
Returned pts call. Stated bp is doing much better so she r/s her October problem visit to December. If doing well at that time, may cancel the appt. Sin

## 2025-04-16 ENCOUNTER — OFFICE VISIT (OUTPATIENT)
Dept: CARDIOLOGY | Facility: CLINIC | Age: 65
End: 2025-04-16
Payer: COMMERCIAL

## 2025-04-16 VITALS
SYSTOLIC BLOOD PRESSURE: 120 MMHG | BODY MASS INDEX: 41.77 KG/M2 | OXYGEN SATURATION: 96 % | WEIGHT: 227 LBS | HEIGHT: 62 IN | HEART RATE: 85 BPM | DIASTOLIC BLOOD PRESSURE: 88 MMHG

## 2025-04-16 DIAGNOSIS — I47.10 PAROXYSMAL SVT (SUPRAVENTRICULAR TACHYCARDIA): Primary | ICD-10-CM

## 2025-04-16 DIAGNOSIS — I10 ESSENTIAL HYPERTENSION: ICD-10-CM

## 2025-04-16 DIAGNOSIS — E78.5 DYSLIPIDEMIA: ICD-10-CM

## 2025-04-16 PROCEDURE — 99214 OFFICE O/P EST MOD 30 MIN: CPT | Performed by: INTERNAL MEDICINE

## 2025-04-16 RX ORDER — CYCLOBENZAPRINE HCL 10 MG
TABLET ORAL
COMMUNITY
Start: 2025-04-14

## 2025-04-16 NOTE — PROGRESS NOTES
Reason for Visit: cardiovascular follow up.    HPI:  Shannon Coombs is a 64 y.o. female is here today for follow-up.  She has been doing well for the most part.  She recently had a low blood pressure episode, but mostly it is well controlled.  She will get a flutter in her chest every once in a while.  This does not last very long.  She denies any chest pain, dizziness, syncope, PND, or orthopnea.  Her balance has been off a little.  She is getting new blood work tomorrow.  She has some varicose veins in her legs that bother her.  Her weight is down 5 lbs compared to last visit.      Previous Cardiac Testing and Procedures:  - Echo (6/25/14) EF 65%, grade 2 diastolic dysfunction, mild MR and TR, RVSP 48 mmHg  - Holter monitor (04/14/2017) 21,180 one atrial ectopic beats, runs of nonsustained SVT up to 5 beats at 167 bpm  - Echo (05/16/2017) EF 65%, normal diastolic function, normal valves  - Lower extremity venous ultrasound (12/1/2022) normal duplex of the left lower extremity with no evidence of DVT     Lab data:  - Lipid panel (07/28/2018) total cholesterol 141, HDL 45, LDL 79, triglycerides 86  - Lipid panel (2/15/2019) total cholesterol 165, HDL 60, LDL 85, triglycerides 102  - Lipid panel (6/8/2020) total cholesterol 168, HDL 64, LDL 91, triglycerides 65  - Lipid panel (3/10/2021) total cholesterol 199, HDL 63, , triglycerides 78  - Lipid panel (4/8/2022) total cholesterol 158, HDL 49, LDL 88, triglycerides 105  - Lipid panel (11/27/2024) total cholesterol 165, HDL 55, LDL 92, triglycerides 94    Patient Active Problem List   Diagnosis    Dizziness    Fatigue    History of syncope    Paroxysmal SVT (supraventricular tachycardia)    Morbid obesity with BMI of 40.0-44.9, adult    Dyslipidemia    Essential hypertension       Social History     Tobacco Use    Smoking status: Former     Current packs/day: 0.00     Average packs/day: 0.5 packs/day for 35.0 years (17.5 ttl pk-yrs)     Types:  Cigarettes     Start date: 1984     Quit date: 2019     Years since quittin.2    Smokeless tobacco: Never   Vaping Use    Vaping status: Never Used   Substance Use Topics    Alcohol use: Yes     Comment: rarely    Drug use: No       Family History   Problem Relation Age of Onset    Dementia Mother     Hyperlipidemia Mother     Heart disease Father     Hyperlipidemia Father        The following portions of the patient's history were reviewed and updated as appropriate: allergies, current medications, past family history, past medical history, past social history, past surgical history, and problem list.      Current Outpatient Medications:     acetaminophen (TYLENOL) 650 MG 8 hr tablet, Take 1 tablet by mouth Every 8 (Eight) Hours As Needed for Mild Pain., Disp: , Rfl:     atorvastatin (LIPITOR) 40 MG tablet, Take 2 tablets by mouth Daily. Indications: High Amount of Fats in the Blood, Disp: , Rfl:     Calcium Carbonate-Vitamin D (CALTRATE 600+D PO), , Disp: , Rfl:     Cyanocobalamin ER 1000 MCG tablet controlled-release, Daily., Disp: , Rfl:     cyclobenzaprine (FLEXERIL) 10 MG tablet, , Disp: , Rfl:     famotidine (PEPCID) 40 MG tablet, , Disp: , Rfl:     lisinopril (PRINIVIL,ZESTRIL) 10 MG tablet, Take 1 tablet by mouth Daily., Disp: 30 tablet, Rfl: 11    metoprolol succinate XL (TOPROL-XL) 25 MG 24 hr tablet, Take 1 tablet by mouth Daily. Indications: High Blood Pressure, Disp: , Rfl:     nicotine polacrilex (NICORETTE) 4 MG gum, Chew 1 each As Needed for Smoking Cessation. Indications: Nicotine Addiction, Disp: , Rfl:     Probiotic Product (PROBIOTIC-10 PO), Take 1 capsule by mouth 2 (Two) Times a Day. Indications: maintenance, Disp: , Rfl:     spironolactone (ALDACTONE) 25 MG tablet, Take 1 tablet by mouth Daily. Indications: Edema, Disp: , Rfl:     vitamin B-12 (CYANOCOBALAMIN) 1000 MCG tablet, Take 1 tablet by mouth Daily. Indications: Inadequate Vitamin B12, Disp: , Rfl:     Review of Systems  "  Constitutional: Negative for chills and fever.   Cardiovascular:  Positive for irregular heartbeat. Negative for chest pain and paroxysmal nocturnal dyspnea.   Respiratory:  Negative for cough and shortness of breath.    Skin:  Positive for color change. Negative for rash.   Gastrointestinal:  Negative for abdominal pain and heartburn.   Neurological:  Positive for numbness. Negative for dizziness.       Objective   /88 (BP Location: Left arm, Patient Position: Sitting, Cuff Size: Adult)   Pulse 85   Ht 157.5 cm (62.01\")   Wt 103 kg (227 lb)   SpO2 96%   BMI 41.51 kg/m²   Constitutional:       Appearance: Well-developed. Morbidly obese.   HENT:      Head: Normocephalic and atraumatic.   Pulmonary:      Effort: Pulmonary effort is normal.      Breath sounds: Normal breath sounds.   Cardiovascular:      Normal rate. Regular rhythm.   Edema:     Peripheral edema absent.   Skin:     General: Skin is warm and dry.   Neurological:      Mental Status: Alert and oriented to person, place, and time.       Procedures      ICD-10-CM ICD-9-CM   1. Paroxysmal SVT (supraventricular tachycardia)  I47.10 427.0   2. Essential hypertension  I10 401.9   3. Dyslipidemia  E78.5 272.4         Assessment/Plan:  1.  Paroxysmal supraventricular tachycardia: Regular rhythm on exam.  Only occasional skipped beats and mild palpitations.  Continue metoprolol.      2.  Essential hypertension: Blood pressure is acceptable today with intermittent low blood pressure at home.  Continue lisinopril, metoprolol, and spironolactone.  Spironolactone can be decreased in the future if necessary.         3.  Dyslipidemia: Remains controlled on lipid panel on 11/27/2024.  Continue atorvastatin.    "

## 2025-04-16 NOTE — LETTER
April 16, 2025     Mino Hager MD  [address] [facility] [address]    Patient: Shannon Coombs   YOB: 1960   Date of Visit: 4/16/2025       Dear Mino Hager MD    Shannon Coombs was in my office today. Below is a copy of my note.    If you have questions, please do not hesitate to call me. I look forward to following Shannon along with you.         Sincerely,        Adrian Escalera MD        CC: No Recipients      Reason for Visit: cardiovascular follow up.    HPI:  Shannon Coombs is a 64 y.o. female is here today for follow-up.  She has been doing well for the most part.  She recently had a low blood pressure episode, but mostly it is well controlled.  She will get a flutter in her chest every once in a while.  This does not last very long.  She denies any chest pain, dizziness, syncope, PND, or orthopnea.  Her balance has been off a little.  She is getting new blood work tomorrow.  She has some varicose veins in her legs that bother her.  Her weight is down 5 lbs compared to last visit.      Previous Cardiac Testing and Procedures:  - Echo (6/25/14) EF 65%, grade 2 diastolic dysfunction, mild MR and TR, RVSP 48 mmHg  - Holter monitor (04/14/2017) 21,180 one atrial ectopic beats, runs of nonsustained SVT up to 5 beats at 167 bpm  - Echo (05/16/2017) EF 65%, normal diastolic function, normal valves  - Lower extremity venous ultrasound (12/1/2022) normal duplex of the left lower extremity with no evidence of DVT     Lab data:  - Lipid panel (07/28/2018) total cholesterol 141, HDL 45, LDL 79, triglycerides 86  - Lipid panel (2/15/2019) total cholesterol 165, HDL 60, LDL 85, triglycerides 102  - Lipid panel (6/8/2020) total cholesterol 168, HDL 64, LDL 91, triglycerides 65  - Lipid panel (3/10/2021) total cholesterol 199, HDL 63, , triglycerides 78  - Lipid panel (4/8/2022) total cholesterol 158, HDL 49, LDL 88, triglycerides 105  - Lipid panel  (2024) total cholesterol 165, HDL 55, LDL 92, triglycerides 94    Patient Active Problem List   Diagnosis   • Dizziness   • Fatigue   • History of syncope   • Paroxysmal SVT (supraventricular tachycardia)   • Morbid obesity with BMI of 40.0-44.9, adult   • Dyslipidemia   • Essential hypertension       Social History     Tobacco Use   • Smoking status: Former     Current packs/day: 0.00     Average packs/day: 0.5 packs/day for 35.0 years (17.5 ttl pk-yrs)     Types: Cigarettes     Start date: 1984     Quit date: 2019     Years since quittin.2   • Smokeless tobacco: Never   Vaping Use   • Vaping status: Never Used   Substance Use Topics   • Alcohol use: Yes     Comment: rarely   • Drug use: No       Family History   Problem Relation Age of Onset   • Dementia Mother    • Hyperlipidemia Mother    • Heart disease Father    • Hyperlipidemia Father        The following portions of the patient's history were reviewed and updated as appropriate: allergies, current medications, past family history, past medical history, past social history, past surgical history, and problem list.      Current Outpatient Medications:   •  acetaminophen (TYLENOL) 650 MG 8 hr tablet, Take 1 tablet by mouth Every 8 (Eight) Hours As Needed for Mild Pain., Disp: , Rfl:   •  atorvastatin (LIPITOR) 40 MG tablet, Take 2 tablets by mouth Daily. Indications: High Amount of Fats in the Blood, Disp: , Rfl:   •  Calcium Carbonate-Vitamin D (CALTRATE 600+D PO), , Disp: , Rfl:   •  Cyanocobalamin ER 1000 MCG tablet controlled-release, Daily., Disp: , Rfl:   •  cyclobenzaprine (FLEXERIL) 10 MG tablet, , Disp: , Rfl:   •  famotidine (PEPCID) 40 MG tablet, , Disp: , Rfl:   •  lisinopril (PRINIVIL,ZESTRIL) 10 MG tablet, Take 1 tablet by mouth Daily., Disp: 30 tablet, Rfl: 11  •  metoprolol succinate XL (TOPROL-XL) 25 MG 24 hr tablet, Take 1 tablet by mouth Daily. Indications: High Blood Pressure, Disp: , Rfl:   •  nicotine polacrilex  "(NICORETTE) 4 MG gum, Chew 1 each As Needed for Smoking Cessation. Indications: Nicotine Addiction, Disp: , Rfl:   •  Probiotic Product (PROBIOTIC-10 PO), Take 1 capsule by mouth 2 (Two) Times a Day. Indications: maintenance, Disp: , Rfl:   •  spironolactone (ALDACTONE) 25 MG tablet, Take 1 tablet by mouth Daily. Indications: Edema, Disp: , Rfl:   •  vitamin B-12 (CYANOCOBALAMIN) 1000 MCG tablet, Take 1 tablet by mouth Daily. Indications: Inadequate Vitamin B12, Disp: , Rfl:     Review of Systems   Constitutional: Negative for chills and fever.   Cardiovascular:  Positive for irregular heartbeat. Negative for chest pain and paroxysmal nocturnal dyspnea.   Respiratory:  Negative for cough and shortness of breath.    Skin:  Positive for color change. Negative for rash.   Gastrointestinal:  Negative for abdominal pain and heartburn.   Neurological:  Positive for numbness. Negative for dizziness.       Objective  /88 (BP Location: Left arm, Patient Position: Sitting, Cuff Size: Adult)   Pulse 85   Ht 157.5 cm (62.01\")   Wt 103 kg (227 lb)   SpO2 96%   BMI 41.51 kg/m²   Constitutional:       Appearance: Well-developed. Morbidly obese.   HENT:      Head: Normocephalic and atraumatic.   Pulmonary:      Effort: Pulmonary effort is normal.      Breath sounds: Normal breath sounds.   Cardiovascular:      Normal rate. Regular rhythm.   Edema:     Peripheral edema absent.   Skin:     General: Skin is warm and dry.   Neurological:      Mental Status: Alert and oriented to person, place, and time.       Procedures      ICD-10-CM ICD-9-CM   1. Paroxysmal SVT (supraventricular tachycardia)  I47.10 427.0   2. Essential hypertension  I10 401.9   3. Dyslipidemia  E78.5 272.4         Assessment/Plan:  1.  Paroxysmal supraventricular tachycardia: Regular rhythm on exam.  Only occasional skipped beats and mild palpitations.  Continue metoprolol.      2.  Essential hypertension: Blood pressure is acceptable today with " intermittent low blood pressure at home.  Continue lisinopril, metoprolol, and spironolactone.  Spironolactone can be decreased in the future if necessary.         3.  Dyslipidemia: Remains controlled on lipid panel on 11/27/2024.  Continue atorvastatin.

## 2025-05-22 ENCOUNTER — TELEPHONE (OUTPATIENT)
Dept: CARDIOLOGY | Facility: CLINIC | Age: 65
End: 2025-05-22
Payer: COMMERCIAL

## 2025-05-22 NOTE — TELEPHONE ENCOUNTER
Pt called this morning having some issues with her BP.  On Monday, she saw the GI doctor and her BP was running very low, 90/60 and 80/50.  They advised her to see her PCP.  She went the same day to PCP and they advised her to HOLD her lisinopril unless her BP started to creep up around 140 SBP, then to take lisinopril.  Since holding the lisinopril her BP has elevated.      5-20   146/80  59             163/93  70  139/77  68  154/94  71    5-21     146/80   59   163/93  70  139/77   68  154/94   71    5-22      171/86   59      I asked pt is she took a lisinopril with any of these higher SBP's over 140 and she admitted she did not.  She explained that she also takes metoprolol usually in the evenings as well.  She also mentioned that PCP did labs and her potassium is high.  Then pt spoke with her pharmacist and told her that she needed to be careful with high potassium taking both lisinopril as well as spironolactone.  Pt stated she has been having awful hand cramps and she thinks it's related to the high potassium.    Please advise on if pt should change the way she is taking any of her medications.